# Patient Record
Sex: FEMALE | Race: WHITE | NOT HISPANIC OR LATINO | Employment: OTHER | URBAN - METROPOLITAN AREA
[De-identification: names, ages, dates, MRNs, and addresses within clinical notes are randomized per-mention and may not be internally consistent; named-entity substitution may affect disease eponyms.]

---

## 2017-03-13 ENCOUNTER — APPOINTMENT (OUTPATIENT)
Dept: GENERAL RADIOLOGY | Facility: HOSPITAL | Age: 82
End: 2017-03-13

## 2017-03-13 ENCOUNTER — HOSPITAL ENCOUNTER (EMERGENCY)
Facility: HOSPITAL | Age: 82
Discharge: HOME | End: 2017-03-13
Attending: EMERGENCY MEDICINE | Admitting: EMERGENCY MEDICINE

## 2017-03-13 VITALS
HEIGHT: 63 IN | WEIGHT: 144 LBS | OXYGEN SATURATION: 98 % | DIASTOLIC BLOOD PRESSURE: 79 MMHG | BODY MASS INDEX: 25.52 KG/M2 | TEMPERATURE: 98 F | SYSTOLIC BLOOD PRESSURE: 136 MMHG | RESPIRATION RATE: 14 BRPM | HEART RATE: 67 BPM

## 2017-03-13 DIAGNOSIS — R07.9 CHEST PAIN, UNSPECIFIED TYPE: Primary | ICD-10-CM

## 2017-03-13 LAB
ALBUMIN SERPL-MCNC: 4.6 G/DL (ref 3.2–4.8)
ALBUMIN/GLOB SERPL: 1.4 G/DL (ref 1.5–2.5)
ALP SERPL-CCNC: 83 U/L (ref 25–100)
ALT SERPL W P-5'-P-CCNC: 14 U/L (ref 7–40)
ANION GAP SERPL CALCULATED.3IONS-SCNC: 7 MMOL/L (ref 3–11)
AST SERPL-CCNC: 29 U/L (ref 0–33)
BASOPHILS # BLD AUTO: 0.04 10*3/MM3 (ref 0–0.2)
BASOPHILS NFR BLD AUTO: 0.5 % (ref 0–1)
BILIRUB SERPL-MCNC: 0.6 MG/DL (ref 0.3–1.2)
BNP SERPL-MCNC: 120 PG/ML (ref 0–100)
BUN BLD-MCNC: 11 MG/DL (ref 9–23)
BUN/CREAT SERPL: 18.3 (ref 7–25)
CALCIUM SPEC-SCNC: 10 MG/DL (ref 8.7–10.4)
CHLORIDE SERPL-SCNC: 104 MMOL/L (ref 99–109)
CO2 SERPL-SCNC: 29 MMOL/L (ref 20–31)
CREAT BLD-MCNC: 0.6 MG/DL (ref 0.6–1.3)
D DIMER PPP FEU-MCNC: 0.34 MG/L (FEU) (ref 0–0.5)
DEPRECATED RDW RBC AUTO: 45 FL (ref 37–54)
EOSINOPHIL # BLD AUTO: 0.14 10*3/MM3 (ref 0.1–0.3)
EOSINOPHIL NFR BLD AUTO: 1.9 % (ref 0–3)
ERYTHROCYTE [DISTWIDTH] IN BLOOD BY AUTOMATED COUNT: 13.7 % (ref 11.3–14.5)
GFR SERPL CREATININE-BSD FRML MDRD: 96 ML/MIN/1.73
GLOBULIN UR ELPH-MCNC: 3.2 GM/DL
GLUCOSE BLD-MCNC: 97 MG/DL (ref 70–100)
HCT VFR BLD AUTO: 40.4 % (ref 34.5–44)
HGB BLD-MCNC: 13.5 G/DL (ref 11.5–15.5)
HOLD SPECIMEN: NORMAL
HOLD SPECIMEN: NORMAL
IMM GRANULOCYTES # BLD: 0.01 10*3/MM3 (ref 0–0.03)
IMM GRANULOCYTES NFR BLD: 0.1 % (ref 0–0.6)
LIPASE SERPL-CCNC: 36 U/L (ref 6–51)
LYMPHOCYTES # BLD AUTO: 2.19 10*3/MM3 (ref 0.6–4.8)
LYMPHOCYTES NFR BLD AUTO: 29 % (ref 24–44)
MCH RBC QN AUTO: 29.9 PG (ref 27–31)
MCHC RBC AUTO-ENTMCNC: 33.4 G/DL (ref 32–36)
MCV RBC AUTO: 89.6 FL (ref 80–99)
MONOCYTES # BLD AUTO: 0.48 10*3/MM3 (ref 0–1)
MONOCYTES NFR BLD AUTO: 6.4 % (ref 0–12)
NEUTROPHILS # BLD AUTO: 4.68 10*3/MM3 (ref 1.5–8.3)
NEUTROPHILS NFR BLD AUTO: 62.1 % (ref 41–71)
PLATELET # BLD AUTO: 253 10*3/MM3 (ref 150–450)
PMV BLD AUTO: 9.4 FL (ref 6–12)
POTASSIUM BLD-SCNC: 3.9 MMOL/L (ref 3.5–5.5)
PROT SERPL-MCNC: 7.8 G/DL (ref 5.7–8.2)
RBC # BLD AUTO: 4.51 10*6/MM3 (ref 3.89–5.14)
SODIUM BLD-SCNC: 140 MMOL/L (ref 132–146)
TROPONIN I SERPL-MCNC: 0 NG/ML (ref 0–0.07)
TROPONIN I SERPL-MCNC: 0 NG/ML (ref 0–0.07)
WBC NRBC COR # BLD: 7.54 10*3/MM3 (ref 3.5–10.8)
WHOLE BLOOD HOLD SPECIMEN: NORMAL
WHOLE BLOOD HOLD SPECIMEN: NORMAL

## 2017-03-13 PROCEDURE — 80053 COMPREHEN METABOLIC PANEL: CPT | Performed by: EMERGENCY MEDICINE

## 2017-03-13 PROCEDURE — 83880 ASSAY OF NATRIURETIC PEPTIDE: CPT | Performed by: EMERGENCY MEDICINE

## 2017-03-13 PROCEDURE — 85379 FIBRIN DEGRADATION QUANT: CPT | Performed by: NURSE PRACTITIONER

## 2017-03-13 PROCEDURE — 83690 ASSAY OF LIPASE: CPT | Performed by: EMERGENCY MEDICINE

## 2017-03-13 PROCEDURE — 99284 EMERGENCY DEPT VISIT MOD MDM: CPT

## 2017-03-13 PROCEDURE — 85025 COMPLETE CBC W/AUTO DIFF WBC: CPT | Performed by: EMERGENCY MEDICINE

## 2017-03-13 PROCEDURE — 84484 ASSAY OF TROPONIN QUANT: CPT

## 2017-03-13 PROCEDURE — 71010 HC CHEST PA OR AP: CPT

## 2017-03-13 PROCEDURE — 93005 ELECTROCARDIOGRAM TRACING: CPT

## 2017-03-13 PROCEDURE — 36415 COLL VENOUS BLD VENIPUNCTURE: CPT

## 2017-03-13 PROCEDURE — 93005 ELECTROCARDIOGRAM TRACING: CPT | Performed by: EMERGENCY MEDICINE

## 2017-03-13 RX ORDER — MINOXIDIL 2.5 MG/1
2.5 TABLET ORAL 2 TIMES DAILY
COMMUNITY
End: 2019-11-13

## 2017-03-13 RX ORDER — SODIUM CHLORIDE 0.9 % (FLUSH) 0.9 %
10 SYRINGE (ML) INJECTION AS NEEDED
Status: DISCONTINUED | OUTPATIENT
Start: 2017-03-13 | End: 2017-03-13 | Stop reason: HOSPADM

## 2017-03-13 RX ORDER — VITAMIN E 268 MG
400 CAPSULE ORAL DAILY
Status: ON HOLD | COMMUNITY
End: 2017-04-13

## 2017-03-13 RX ORDER — AMLODIPINE BESYLATE 5 MG/1
5 TABLET ORAL EVERY MORNING
COMMUNITY
End: 2019-11-13

## 2017-03-13 RX ORDER — NITROGLYCERIN 0.4 MG/1
0.4 TABLET SUBLINGUAL
Status: DISCONTINUED | OUTPATIENT
Start: 2017-03-13 | End: 2017-03-13 | Stop reason: HOSPADM

## 2017-03-13 RX ORDER — ASPIRIN 81 MG/1
324 TABLET, CHEWABLE ORAL ONCE
Status: DISCONTINUED | OUTPATIENT
Start: 2017-03-13 | End: 2017-03-13 | Stop reason: HOSPADM

## 2017-03-13 RX ORDER — MULTIVITAMIN WITH IRON
250 TABLET ORAL DAILY
Status: ON HOLD | COMMUNITY
End: 2017-04-13

## 2017-03-13 RX ORDER — MELATONIN
1000 DAILY
COMMUNITY
End: 2023-03-08 | Stop reason: ALTCHOICE

## 2017-03-13 RX ORDER — ASPIRIN 81 MG/1
81 TABLET ORAL EVERY MORNING
COMMUNITY
End: 2019-11-13

## 2017-03-13 RX ORDER — LOSARTAN POTASSIUM 100 MG/1
50 TABLET ORAL DAILY
COMMUNITY
End: 2023-03-08 | Stop reason: ALTCHOICE

## 2017-03-13 RX ADMIN — Medication 10 ML: at 15:40

## 2017-03-13 RX ADMIN — NITROGLYCERIN 0.4 MG: 0.4 TABLET SUBLINGUAL at 16:32

## 2017-03-13 RX ADMIN — NITROGLYCERIN 0.4 MG: 0.4 TABLET SUBLINGUAL at 16:46

## 2017-03-13 NOTE — ED PROVIDER NOTES
Subjective   HPI Comments: Seen at pcp for cp today. Thought to have an irregular hr. Sent to er for eval. No hx of afib.    Patient is a 81 y.o. female presenting with chest pain.   Chest Pain   Pain location:  L chest  Pain quality: aching    Pain radiates to:  Does not radiate  Onset quality:  Gradual  Timing:  Constant  Progression:  Waxing and waning  Chronicity:  New  Context: at rest    Relieved by:  Rest  Worsened by:  Exertion  Associated symptoms: no abdominal pain, no back pain, no fever and no shortness of breath        Review of Systems   Constitutional: Negative for fever.   Respiratory: Negative for shortness of breath.    Cardiovascular: Positive for chest pain.   Gastrointestinal: Negative for abdominal pain.   Musculoskeletal: Negative for back pain.   All other systems reviewed and are negative.      Past Medical History   Diagnosis Date   • Hypertension    • Irregular heart rhythm        No Known Allergies    History reviewed. No pertinent past surgical history.    History reviewed. No pertinent family history.    Social History     Social History   • Marital status:      Spouse name: N/A   • Number of children: N/A   • Years of education: N/A     Social History Main Topics   • Smoking status: Never Smoker   • Smokeless tobacco: None   • Alcohol use No   • Drug use: No   • Sexual activity: Not Asked     Other Topics Concern   • None     Social History Narrative   • None           Objective   Physical Exam   Constitutional: She is oriented to person, place, and time. She appears well-developed and well-nourished.   HENT:   Head: Normocephalic and atraumatic.   Right Ear: External ear normal.   Left Ear: External ear normal.   Nose: Nose normal.   Mouth/Throat: Oropharynx is clear and moist.   Eyes: Conjunctivae and EOM are normal. Pupils are equal, round, and reactive to light.   Neck: Normal range of motion. Neck supple.   Cardiovascular: Normal rate, regular rhythm, normal heart sounds  and intact distal pulses.    Pulmonary/Chest: Effort normal and breath sounds normal.   Abdominal: Soft. Bowel sounds are normal.   Musculoskeletal: Normal range of motion.   Neurological: She is alert and oriented to person, place, and time.   Skin: Skin is warm and dry.   Psychiatric: She has a normal mood and affect. Her behavior is normal. Judgment and thought content normal.       Procedures         ED Course  ED Course   Comment By Time   NSR on monitor. Cxr and labs reassuring. Will send to cp clinic. Well aware of the ss of worsening condition. All thankful and agreeable. SHELLIE Marie 03/13 [id number]                  [facility]    Final diagnoses:   Chest pain, unspecified type            SHELLIE Marie  03/13/17 2046

## 2017-03-16 ENCOUNTER — APPOINTMENT (OUTPATIENT)
Dept: CARDIOLOGY | Facility: HOSPITAL | Age: 82
End: 2017-03-16

## 2017-03-16 ENCOUNTER — OFFICE VISIT (OUTPATIENT)
Dept: CARDIOLOGY | Facility: HOSPITAL | Age: 82
End: 2017-03-16

## 2017-03-16 VITALS
WEIGHT: 150.2 LBS | HEART RATE: 79 BPM | OXYGEN SATURATION: 98 % | BODY MASS INDEX: 26.61 KG/M2 | SYSTOLIC BLOOD PRESSURE: 145 MMHG | TEMPERATURE: 98.3 F | HEIGHT: 63 IN | DIASTOLIC BLOOD PRESSURE: 89 MMHG | RESPIRATION RATE: 18 BRPM

## 2017-03-16 DIAGNOSIS — I10 ESSENTIAL HYPERTENSION: ICD-10-CM

## 2017-03-16 DIAGNOSIS — R00.2 PALPITATIONS: Primary | ICD-10-CM

## 2017-03-16 DIAGNOSIS — I35.1 AORTIC VALVE INSUFFICIENCY, UNSPECIFIED ETIOLOGY: ICD-10-CM

## 2017-03-16 PROCEDURE — 99215 OFFICE O/P EST HI 40 MIN: CPT | Performed by: NURSE PRACTITIONER

## 2017-03-16 NOTE — PROGRESS NOTES
[facility]  [facility]  Chest Pain Clinic    Encounter Date:03/16/2017     Colleen Benavides  [address] [place] [address]  [phone]    1935    Liza Pacheco MD    Colleen Benavides is a 81 y.o. female.      Subjective:     Chief Complaint:  Chest Pain ([facility] ER 3/13/17)       HPI     Pt seen [facility] ER on 3/13/2017.  Patient states she had felt unwell for 2-3 days.  She checked her heart rate was elevated at 108 with an elevated blood pressure.  She spoke with her primary care provider who encouraged her to go to the emergency department.  Patient states that typically her heart rate is in the 50s and 60s.  She has a history of irregular heart beats, palpitations.  States she has worn a Holter monitor in the past while she lived in [place] approximately 2005.  At that time she also had an echocardiogram and reported stress test and was diagnosed with aortic regurgitation.  Data is deficient.  Patient states that she had a stress test since then here in [place] which she feels is completed at [facility], but I cannot find medical records to dispense support at this time.  Patient denies chest pain but will state she has a fluttering sensation in the center of her chest that is intermittent, short duration, this is been occurring for many years.  Denies worsening dyspnea.  States she'll feel mild dyspnea with exertion when she is climbing her steps resolves quickly this has not worsened.  Denies dizziness, presyncope, syncope.  She has mild lower extremity edema that improves with elevation.  She does have a history of hypertension.  Her ER visit troponins were negative, EKG showed sinus rhythm with PACs, chest x-ray failed enlarged heart but no acute pulmonary cardiac process.  A denies history of CAD, CVA/TIA, diagnosed atrial fibrillation or other arrhythmias, kidney disease, pulmonary disease, diabetes.  Patient has never smoked.    Cardiac risk  factors:  History of, hypertension.   age (>50)    Past Medical History   Diagnosis Date   • Aortic regurgitation    • Hypertension    • Irregular heart rhythm        Past Surgical History   Procedure Laterality Date   • Appendectomy     • Ovarian cyst removal Left    • Breast lumpectomy     • Small intestine surgery         No Known Allergies      Current Outpatient Prescriptions:   •  amLODIPine (NORVASC) 5 MG tablet, Take 5 mg by mouth Daily., Disp: , Rfl:   •  aspirin 81 MG EC tablet, Take 81 mg by mouth Daily., Disp: , Rfl:   •  cholecalciferol (VITAMIN D3) 1000 UNITS tablet, Take 1,000 Units by mouth Daily., Disp: , Rfl:   •  Flaxseed, Linseed, (COLD MILLED BAER FLAX SEED PO), Take 1,200 mg by mouth Daily., Disp: , Rfl:   •  losartan (COZAAR) 100 MG tablet, Take 100 mg by mouth Daily., Disp: , Rfl:   •  Magnesium 250 MG tablet, Take 250 mg by mouth Daily., Disp: , Rfl:   •  minoxidil (LONITEN) 2.5 MG tablet, Take 2.5 mg by mouth 2 (Two) Times a Day., Disp: , Rfl:   •  vitamin E 400 UNIT capsule, Take 400 Units by mouth Daily., Disp: , Rfl:     The following portions of the patient's history were reviewed and updated as appropriate in Epic:  Problem list, allergies, current medications, past medical and surgical history, past social and family history.     Review of Systems   Constitution: Negative for chills, decreased appetite, diaphoresis, fever, weakness, malaise/fatigue, night sweats, weight gain and weight loss.   HENT: Positive for congestion and headaches (migraine headaches). Negative for nosebleeds.    Eyes: Positive for visual disturbance. Negative for blurred vision and visual halos.   Cardiovascular: Positive for chest pain (fluttering sensation, not pain), irregular heartbeat (mild) and leg swelling. Negative for claudication, cyanosis, dyspnea on exertion, near-syncope, orthopnea, palpitations, paroxysmal nocturnal dyspnea and syncope.   Respiratory: Positive for snoring. Negative for cough,  "hemoptysis, shortness of breath, sleep disturbances due to breathing, sputum production and wheezing.    Endocrine: Negative.  Negative for cold intolerance, heat intolerance, polydipsia, polyphagia and polyuria.   Hematologic/Lymphatic: Negative.  Does not bruise/bleed easily.   Skin: Negative.  Negative for dry skin, itching and rash.   Musculoskeletal: Positive for arthritis. Negative for falls, joint pain, joint swelling, muscle weakness and myalgias.   Gastrointestinal: Negative.  Negative for bloating, abdominal pain, constipation, diarrhea, dysphagia, heartburn, melena, nausea and vomiting.   Genitourinary: Positive for nocturia. Negative for dysuria, flank pain and hematuria.   Neurological: Negative for difficulty with concentration, excessive daytime sleepiness, dizziness and loss of balance.   Psychiatric/Behavioral: Negative for altered mental status and depression. The patient has insomnia. The patient is not nervous/anxious.    Allergic/Immunologic: Positive for environmental allergies.       Objective:     Vitals:    03/16/17 0857 03/16/17 0911 03/16/17 0912   BP: 145/75 147/74 145/89   BP Location: Right arm Left arm Left arm   Patient Position: Sitting Sitting Standing   Pulse: 53  79   Resp: 18     Temp: 98.3 °F (36.8 °C)     TempSrc: Temporal Artery      SpO2: 98%     Weight: 150 lb 3.2 oz (68.1 kg)     Height: 63\" (160 cm)           Physical Exam   Constitutional: She is oriented to person, place, and time. She appears well-developed and well-nourished. No distress.   HENT:   Head: Normocephalic and atraumatic.   Mouth/Throat: Oropharynx is clear and moist.   Eyes: Conjunctivae are normal. Pupils are equal, round, and reactive to light. No scleral icterus.   Neck: No hepatojugular reflux and no JVD present. Carotid bruit is not present. No tracheal deviation present. No thyromegaly present.   Cardiovascular: Normal rate, regular rhythm, normal heart sounds and intact distal pulses.  Exam reveals " no friction rub.    No murmur heard.  Pulmonary/Chest: Effort normal and breath sounds normal.   Abdominal: Soft. Bowel sounds are normal. She exhibits no distension. There is no tenderness.   Musculoskeletal: She exhibits no edema.   Lymphadenopathy:     She has no cervical adenopathy.   Neurological: She is alert and oriented to person, place, and time.   Skin: Skin is warm, dry and intact. No rash noted. No cyanosis or erythema. No pallor.   Psychiatric: She has a normal mood and affect. Her behavior is normal. Thought content normal.   Vitals reviewed.      Lab and Diagnostic Review:  Admission on 03/13/2017, Discharged on 03/13/2017   Component Date Value Ref Range Status   • Glucose 03/13/2017 97  70 - 100 mg/dL Final   • BUN 03/13/2017 11  9 - 23 mg/dL Final   • Creatinine 03/13/2017 0.60  0.60 - 1.30 mg/dL Final   • Sodium 03/13/2017 140  132 - 146 mmol/L Final   • Potassium 03/13/2017 3.9  3.5 - 5.5 mmol/L Final   • Chloride 03/13/2017 104  99 - 109 mmol/L Final   • CO2 03/13/2017 29.0  20.0 - 31.0 mmol/L Final   • Calcium 03/13/2017 10.0  8.7 - 10.4 mg/dL Final   • Total Protein 03/13/2017 7.8  5.7 - 8.2 g/dL Final   • Albumin 03/13/2017 4.60  3.20 - 4.80 g/dL Final   • ALT (SGPT) 03/13/2017 14  7 - 40 U/L Final   • AST (SGOT) 03/13/2017 29  0 - 33 U/L Final   • Alkaline Phosphatase 03/13/2017 83  25 - 100 U/L Final   • Total Bilirubin 03/13/2017 0.6  0.3 - 1.2 mg/dL Final   • eGFR Non African Amer 03/13/2017 96  >60 mL/min/1.73 Final   • Globulin 03/13/2017 3.2  gm/dL Final   • A/G Ratio 03/13/2017 1.4* 1.5 - 2.5 g/dL Final   • BUN/Creatinine Ratio 03/13/2017 18.3  7.0 - 25.0 Final   • Anion Gap 03/13/2017 7.0  3.0 - 11.0 mmol/L Final   • Lipase 03/13/2017 36  6 - 51 U/L Final   • BNP 03/13/2017 120.0* 0.0 - 100.0 pg/mL Final   • Extra Tube 03/13/2017 hold for add-on   Final    Auto resulted   • Extra Tube 03/13/2017 Hold for add-ons.   Final    Auto resulted.   • Extra Tube 03/13/2017 hold for add-on    Final    Auto resulted   • Extra Tube 03/13/2017 Hold for add-ons.   Final    Auto resulted.   • WBC 03/13/2017 7.54  3.50 - 10.80 10*3/mm3 Final   • RBC 03/13/2017 4.51  3.89 - 5.14 10*6/mm3 Final   • Hemoglobin 03/13/2017 13.5  11.5 - 15.5 g/dL Final   • Hematocrit 03/13/2017 40.4  34.5 - 44.0 % Final   • MCV 03/13/2017 89.6  80.0 - 99.0 fL Final   • MCH 03/13/2017 29.9  27.0 - 31.0 pg Final   • MCHC 03/13/2017 33.4  32.0 - 36.0 g/dL Final   • RDW 03/13/2017 13.7  11.3 - 14.5 % Final   • RDW-SD 03/13/2017 45.0  37.0 - 54.0 fl Final   • MPV 03/13/2017 9.4  6.0 - 12.0 fL Final   • Platelets 03/13/2017 253  150 - 450 10*3/mm3 Final   • Neutrophil % 03/13/2017 62.1  41.0 - 71.0 % Final   • Lymphocyte % 03/13/2017 29.0  24.0 - 44.0 % Final   • Monocyte % 03/13/2017 6.4  0.0 - 12.0 % Final   • Eosinophil % 03/13/2017 1.9  0.0 - 3.0 % Final   • Basophil % 03/13/2017 0.5  0.0 - 1.0 % Final   • Immature Grans % 03/13/2017 0.1  0.0 - 0.6 % Final   • Neutrophils, Absolute 03/13/2017 4.68  1.50 - 8.30 10*3/mm3 Final   • Lymphocytes, Absolute 03/13/2017 2.19  0.60 - 4.80 10*3/mm3 Final   • Monocytes, Absolute 03/13/2017 0.48  0.00 - 1.00 10*3/mm3 Final   • Eosinophils, Absolute 03/13/2017 0.14  0.10 - 0.30 10*3/mm3 Final   • Basophils, Absolute 03/13/2017 0.04  0.00 - 0.20 10*3/mm3 Final   • Immature Grans, Absolute 03/13/2017 0.01  0.00 - 0.03 10*3/mm3 Final   • Troponin I 03/13/2017 0.00  0.00 - 0.07 ng/mL Final    Serial Number: [id number]    : [id number]   • D-Dimer, Quantitative 03/13/2017 0.34  0.00 - 0.50 mg/L (FEU) Final   • Troponin I 03/13/2017 0.00  0.00 - 0.07 ng/mL Final    Serial Number: [id number]    : [id number]     EXAMINATION: XR CHEST 1 VW-       INDICATION: Chest Pain triage protocol.       COMPARISON: 03/04/2014.      FINDINGS: Portable chest reveals heart to be enlarged. Underlying  chronic and emphysematous change is seen within the lung fields  bilaterally. Degenerative change is seen within the  spine. Pulmonary  vascularity is unremarkable.       IMPRESSION:  No acute cardiopulmonary disease. The heart is enlarged.      D: 03/13/2017  E: 03/14/2017      This report was finalized on 3/15/2017 9:57 AM by Dr. Charleen Medina MD.    EKG 3/13/17: Sinus rhythm with PACs, 88 bpm  Assessment and Plan:     1. Palpitations    - Cardiac Event Monitor:  30 day, Dr. Caballero to read  - Adult Transthoracic Echo Complete; Pt would like to schedule in [place] if possible    2. Essential hypertension      3. Aortic valve insufficiency, unspecified etiology    Discussed scheduling f/u with cardiology.  Pt wants to wait and discuss with her daughter.    F/u pending results.      *Please note that portions of this note were completed with a voice recognition program. Efforts were made to edit the dictations, but occasionally words are mistranscribed.

## 2017-03-17 ENCOUNTER — DOCUMENTATION (OUTPATIENT)
Dept: CARDIOLOGY | Facility: HOSPITAL | Age: 82
End: 2017-03-17

## 2017-03-17 ENCOUNTER — TELEPHONE (OUTPATIENT)
Dept: CARDIOLOGY | Facility: CLINIC | Age: 82
End: 2017-03-17

## 2017-03-17 DIAGNOSIS — I48.0 PAF (PAROXYSMAL ATRIAL FIBRILLATION) (HCC): Primary | ICD-10-CM

## 2017-03-17 NOTE — PROGRESS NOTES
Pt placed on event monitor 03/16/17.  Pt noted to have Afib on 03/17/17 with  bpm.  Called pt to review results.  Education provided:  What is atrial fibrillation, s/s,triggers,  medication management (rate control vs rhythm control) and stroke prevention. Pt is a Chadsvasc 4.  Will start Eliquis 5 mg BID (age 81, Creatinine 0.6, 68.1 kg).  Discussed adding BB, but pt declined at this time.  States her HR 50-60s majority of the time and could not tolerate BB in past.  She is scheduled echo in 1 week.  Discussed cardiology referral, she wants to consider her options at this time.  I have mailed an Afib education manual to the patient for review.  Discussed s/s bleeding that could be associated with Eliquis.

## 2017-03-17 NOTE — PROGRESS NOTES
Labs 2/9/17    Creatinine 0.8, sodium 142, potassium 4.8, chloride 104, carbon dioxide 31, calcium 9.9

## 2017-03-17 NOTE — PROGRESS NOTES
Received and reviewed requested medical records.    A reports a history of aortic stenosis.  Echocardiogram received and reviewed, completed [facility], 7/20/2005: EF 55-60%, no significant aortic stenosis, mild diastolic dysfunction.    Labs completed 2/9/17:  Cholesterol 279, HDL 61, triglycerides 121,   Pt is not on statin.      Labs completed 11/09/16  WBC 5.9, hemoglobin 12.6, hematocrit 38, platelet 267  TSH 3.75    Medical records will be scanned under last office visit 3/16/17

## 2017-03-17 NOTE — TELEPHONE ENCOUNTER
Was paged overhead by [email or web address] in regards to a cardiac monitor alert for Ms. Benavides. Report shows atrial fibrillation with HR at 110 BPM for nearly a minute. Patient currently sees SHELLIE Alvarado [facility] the [facility]. Called their office to notify them of the alert and faxed the report to their office at [phone].

## 2017-03-20 ENCOUNTER — TELEPHONE (OUTPATIENT)
Dept: CARDIOLOGY | Facility: HOSPITAL | Age: 82
End: 2017-03-20

## 2017-03-20 NOTE — TELEPHONE ENCOUNTER
Patient called the office today and noted that she has not started her eliquis yet due to concerns about bleeding. Long discussion with patient regarding need to begin eliquis due to chads vasc 4. Patient is agreeable to begin eliquis 5 mg with the first dose today. She is scheduled for an echo tomorrow. Patient instructed to call the office with any questions.

## 2017-03-23 ENCOUNTER — DOCUMENTATION (OUTPATIENT)
Dept: CARDIOLOGY | Facility: HOSPITAL | Age: 82
End: 2017-03-23

## 2017-03-23 NOTE — PROGRESS NOTES
Received and reviewed requested medical records    Echo 05/11/12:  Biatrial enlargement, LVH with asymmetrical septal hypertrophy, diastolic dysfunction with preserved EF 70%.    Have not been able to obtain a stress test as reported per patient.

## 2017-03-24 ENCOUNTER — DOCUMENTATION (OUTPATIENT)
Dept: CARDIOLOGY | Facility: HOSPITAL | Age: 82
End: 2017-03-24

## 2017-03-24 NOTE — PROGRESS NOTES
Pt called today and states she has scheduled to see Jacki Jeff MD today. She does not want me to schedule cardiology appointment here at [facility].

## 2017-03-27 ENCOUNTER — DOCUMENTATION (OUTPATIENT)
Dept: CARDIOLOGY | Facility: HOSPITAL | Age: 82
End: 2017-03-27

## 2017-03-27 DIAGNOSIS — I35.1 AORTIC VALVE INSUFFICIENCY, UNSPECIFIED ETIOLOGY: ICD-10-CM

## 2017-03-27 DIAGNOSIS — R00.2 PALPITATIONS: ICD-10-CM

## 2017-03-27 NOTE — PROGRESS NOTES
Received echocardiogram completed 3/21/17: EF 60%, LA 3.8 cm, moderate mitral valve insufficiency, mild to moderate aortic insufficiency.    Patient has seen Dr. Jacki Jeff in [facility].  And plans to continue her follow-up and management with Dr. Jeff.

## 2017-04-04 ENCOUNTER — TRANSCRIBE ORDERS (OUTPATIENT)
Dept: CARDIOLOGY | Facility: CLINIC | Age: 82
End: 2017-04-04

## 2017-04-04 DIAGNOSIS — R94.39 ABNORMAL STRESS TEST: Primary | ICD-10-CM

## 2017-04-06 DIAGNOSIS — R94.39 ABNORMAL STRESS TEST: Primary | ICD-10-CM

## 2017-04-06 RX ORDER — SODIUM CHLORIDE 0.9 % (FLUSH) 0.9 %
1-10 SYRINGE (ML) INJECTION AS NEEDED
Status: CANCELLED | OUTPATIENT
Start: 2017-04-06

## 2017-04-06 RX ORDER — ONDANSETRON 2 MG/ML
4 INJECTION INTRAMUSCULAR; INTRAVENOUS EVERY 6 HOURS PRN
Status: CANCELLED | OUTPATIENT
Start: 2017-04-06

## 2017-04-06 RX ORDER — ASPIRIN 325 MG
325 TABLET ORAL ONCE
Status: CANCELLED | OUTPATIENT
Start: 2017-04-06 | End: 2017-04-06

## 2017-04-06 RX ORDER — ASPIRIN 81 MG/1
325 TABLET ORAL DAILY
Status: CANCELLED | OUTPATIENT
Start: 2017-04-07

## 2017-04-06 RX ORDER — NITROGLYCERIN 0.4 MG/1
0.4 TABLET SUBLINGUAL
Status: CANCELLED | OUTPATIENT
Start: 2017-04-06

## 2017-04-06 RX ORDER — ACETAMINOPHEN 325 MG/1
650 TABLET ORAL EVERY 4 HOURS PRN
Status: CANCELLED | OUTPATIENT
Start: 2017-04-06

## 2017-04-13 ENCOUNTER — HOSPITAL ENCOUNTER (OUTPATIENT)
Facility: HOSPITAL | Age: 82
Setting detail: HOSPITAL OUTPATIENT SURGERY
Discharge: HOME | End: 2017-04-13
Attending: INTERNAL MEDICINE | Admitting: INTERNAL MEDICINE

## 2017-04-13 VITALS
HEIGHT: 63 IN | OXYGEN SATURATION: 97 % | WEIGHT: 150.79 LBS | DIASTOLIC BLOOD PRESSURE: 89 MMHG | BODY MASS INDEX: 26.72 KG/M2 | HEART RATE: 58 BPM | RESPIRATION RATE: 16 BRPM | SYSTOLIC BLOOD PRESSURE: 174 MMHG | TEMPERATURE: 98.5 F

## 2017-04-13 DIAGNOSIS — R94.39 ABNORMAL STRESS TEST: ICD-10-CM

## 2017-04-13 LAB
ARTICHOKE IGE QN: 141 MG/DL (ref 0–130)
CHOLEST SERPL-MCNC: 198 MG/DL (ref 0–200)
HBA1C MFR BLD: 5.4 % (ref 4.8–5.6)
HDLC SERPL-MCNC: 48 MG/DL (ref 40–60)
TRIGL SERPL-MCNC: 85 MG/DL (ref 0–150)

## 2017-04-13 PROCEDURE — S0260 H&P FOR SURGERY: HCPCS | Performed by: INTERNAL MEDICINE

## 2017-04-13 PROCEDURE — C1769 GUIDE WIRE: HCPCS | Performed by: INTERNAL MEDICINE

## 2017-04-13 PROCEDURE — 93458 L HRT ARTERY/VENTRICLE ANGIO: CPT | Performed by: INTERNAL MEDICINE

## 2017-04-13 PROCEDURE — C1894 INTRO/SHEATH, NON-LASER: HCPCS | Performed by: INTERNAL MEDICINE

## 2017-04-13 PROCEDURE — 0 IOPAMIDOL PER 1 ML: Performed by: INTERNAL MEDICINE

## 2017-04-13 PROCEDURE — 36415 COLL VENOUS BLD VENIPUNCTURE: CPT

## 2017-04-13 PROCEDURE — 83036 HEMOGLOBIN GLYCOSYLATED A1C: CPT | Performed by: NURSE PRACTITIONER

## 2017-04-13 PROCEDURE — 25010000002 MIDAZOLAM PER 1 MG: Performed by: INTERNAL MEDICINE

## 2017-04-13 PROCEDURE — 80061 LIPID PANEL: CPT | Performed by: NURSE PRACTITIONER

## 2017-04-13 PROCEDURE — 25010000002 FENTANYL CITRATE (PF) 100 MCG/2ML SOLUTION: Performed by: INTERNAL MEDICINE

## 2017-04-13 RX ORDER — ONDANSETRON 2 MG/ML
4 INJECTION INTRAMUSCULAR; INTRAVENOUS EVERY 6 HOURS PRN
Status: DISCONTINUED | OUTPATIENT
Start: 2017-04-13 | End: 2017-04-13 | Stop reason: HOSPADM

## 2017-04-13 RX ORDER — SODIUM CHLORIDE 9 MG/ML
1-3 INJECTION, SOLUTION INTRAVENOUS CONTINUOUS
Status: DISCONTINUED | OUTPATIENT
Start: 2017-04-13 | End: 2017-04-13 | Stop reason: HOSPADM

## 2017-04-13 RX ORDER — ASPIRIN 325 MG
325 TABLET, DELAYED RELEASE (ENTERIC COATED) ORAL DAILY
Status: DISCONTINUED | OUTPATIENT
Start: 2017-04-14 | End: 2017-04-13 | Stop reason: HOSPADM

## 2017-04-13 RX ORDER — FENTANYL CITRATE 50 UG/ML
INJECTION, SOLUTION INTRAMUSCULAR; INTRAVENOUS AS NEEDED
Status: DISCONTINUED | OUTPATIENT
Start: 2017-04-13 | End: 2017-04-13 | Stop reason: HOSPADM

## 2017-04-13 RX ORDER — SODIUM CHLORIDE 0.9 % (FLUSH) 0.9 %
1-10 SYRINGE (ML) INJECTION AS NEEDED
Status: CANCELLED | OUTPATIENT
Start: 2017-04-13

## 2017-04-13 RX ORDER — SODIUM CHLORIDE 0.9 % (FLUSH) 0.9 %
1-10 SYRINGE (ML) INJECTION AS NEEDED
Status: DISCONTINUED | OUTPATIENT
Start: 2017-04-13 | End: 2017-04-13 | Stop reason: HOSPADM

## 2017-04-13 RX ORDER — HYDROCODONE BITARTRATE AND ACETAMINOPHEN 5; 325 MG/1; MG/1
1 TABLET ORAL EVERY 4 HOURS PRN
Status: CANCELLED | OUTPATIENT
Start: 2017-04-13 | End: 2017-04-23

## 2017-04-13 RX ORDER — THIAMINE HCL 100 MG
500 TABLET ORAL DAILY
COMMUNITY
End: 2019-11-13

## 2017-04-13 RX ORDER — ASPIRIN 81 MG/1
81 TABLET, CHEWABLE ORAL DAILY
Status: CANCELLED | OUTPATIENT
Start: 2017-04-13

## 2017-04-13 RX ORDER — ASPIRIN 325 MG
325 TABLET ORAL ONCE
Status: DISCONTINUED | OUTPATIENT
Start: 2017-04-13 | End: 2017-04-13 | Stop reason: HOSPADM

## 2017-04-13 RX ORDER — NITROGLYCERIN 0.4 MG/1
0.4 TABLET SUBLINGUAL
Status: DISCONTINUED | OUTPATIENT
Start: 2017-04-13 | End: 2017-04-13 | Stop reason: HOSPADM

## 2017-04-13 RX ORDER — MIDAZOLAM HYDROCHLORIDE 1 MG/ML
INJECTION INTRAMUSCULAR; INTRAVENOUS AS NEEDED
Status: DISCONTINUED | OUTPATIENT
Start: 2017-04-13 | End: 2017-04-13 | Stop reason: HOSPADM

## 2017-04-13 RX ORDER — ASPIRIN 81 MG/1
TABLET, CHEWABLE ORAL
Status: COMPLETED
Start: 2017-04-13 | End: 2017-04-13

## 2017-04-13 RX ORDER — LIDOCAINE HYDROCHLORIDE 10 MG/ML
INJECTION, SOLUTION INFILTRATION; PERINEURAL AS NEEDED
Status: DISCONTINUED | OUTPATIENT
Start: 2017-04-13 | End: 2017-04-13 | Stop reason: HOSPADM

## 2017-04-13 RX ORDER — ACETAMINOPHEN 325 MG/1
650 TABLET ORAL EVERY 4 HOURS PRN
Status: DISCONTINUED | OUTPATIENT
Start: 2017-04-13 | End: 2017-04-13 | Stop reason: HOSPADM

## 2017-04-13 RX ORDER — ACETAMINOPHEN 325 MG/1
650 TABLET ORAL EVERY 4 HOURS PRN
Status: CANCELLED | OUTPATIENT
Start: 2017-04-13

## 2017-04-13 RX ORDER — MULTIVIT WITH MINERALS/LUTEIN
1000 TABLET ORAL DAILY
COMMUNITY
End: 2019-11-13

## 2017-04-13 RX ADMIN — ASPIRIN 243 MG: 81 TABLET, CHEWABLE ORAL at 09:44

## 2017-04-13 RX ADMIN — SODIUM CHLORIDE 2.98 ML/KG/HR: 9 INJECTION, SOLUTION INTRAVENOUS at 09:40

## 2017-04-13 NOTE — PLAN OF CARE
Problem: Patient Care Overview (Adult)  Goal: Plan of Care Review  Outcome: Ongoing (interventions implemented as appropriate)    04/13/17 1515   Patient Care Overview   Progress improving   Coping/Psychosocial Response Interventions   Plan Of Care Reviewed With patient;family   Outcome Evaluation   Outcome Summary/Follow up Plan PT AND FAMILY ABLE TO VERBALIZE UNDERSTANDING OF DC INSTRUCTIONS- NO QUESTIONS AT THIS TIME- SITE STABLE AND AMBULATING WITH NO ISSUES.       Goal: Discharge Needs Assessment  Outcome: Ongoing (interventions implemented as appropriate)    04/13/17 1515   Discharge Needs Assessment   Concerns To Be Addressed no discharge needs identified         Problem: Cardiac Catheterization with/without PCI (Adult)  Goal: Signs and Symptoms of Listed Potential Problems Will be Absent or Manageable (Cardiac Catheterization with/without PCI)  Outcome: Ongoing (interventions implemented as appropriate)    04/13/17 1410   Cardiac Catheterization with/without PCI   Problems Assessed (Cardiac Catheterization) all   Problems Present (Cardiac Catheterization) none

## 2017-04-13 NOTE — H&P
[facility] at [facility]   History and Physical    Referring Provider: Jacki Jeff MD    Patient Care Team:  Liza Pacheco MD as PCP - General (Family Medicine)     Problem List:  1. Abnormal stress test  1. 3/26/17 myocardial perfusion study with anterior and inferior ischemia.  Normal EF.  2. Paroxysmal atrial fibrillation  3. Hypertension  4. Dyslipidemia  5. Surgeries:  1. Appendectomy  2. Left ovarian tumor resection  3. Partial hysterectomy  4. Left rotator cuff surgery  5. Lumpectomy, breast  6. Intestine mass resection.      No Known Allergies        Current Facility-Administered Medications:   •  acetaminophen (TYLENOL) tablet 650 mg, 650 mg, Oral, Q4H PRN, SHELLIE Trejo  •  aspirin tablet 325 mg, 325 mg, Oral, Once **AND** [START ON 4/14/2017] aspirin EC tablet 325 mg, 325 mg, Oral, Daily, SHELLIE Trejo  •  nitroglycerin (NITROSTAT) SL tablet 0.4 mg, 0.4 mg, Sublingual, Q5 Min PRN, SHELLIE Trejo  •  ondansetron (ZOFRAN) injection 4 mg, 4 mg, Intravenous, Q6H PRN, SHELLIE Trejo  •  sodium chloride 0.9 % flush 1-10 mL, 1-10 mL, Intravenous, PRN, SHELLIE Trejo  •  sodium chloride 0.9 % infusion, 1-3 mL/kg/hr, Intravenous, Continuous, Lee Laoz MD      sodium chloride 1-3 mL/kg/hr       Prescriptions Prior to Admission   Medication Sig Dispense Refill Last Dose   • amLODIPine (NORVASC) 5 MG tablet Take 5 mg by mouth Every Morning.   4/13/2017 at 0630   • aspirin 81 MG EC tablet Take 81 mg by mouth Every Morning.   4/13/2017 at 0630   • cholecalciferol (VITAMIN D3) 1000 UNITS tablet Take 1,000 Units by mouth Daily.   4/12/2017 at Unknown time   • losartan (COZAAR) 100 MG tablet Take 100 mg by mouth Every Morning.   4/13/2017 at 0630   • Magnesium 500 MG tablet Take 500 mg by mouth Daily.   4/12/2017 at Unknown time   • minoxidil (LONITEN) 2.5 MG tablet Take 2.5 mg by mouth 2 (Two) Times a Day.   4/13/2017 at 0630   • vitamin E 1000 UNIT  capsule Take 1,000 Units by mouth Daily.   4/12/2017 at Unknown time   • apixaban (ELIQUIS) 5 MG tablet tablet Take 1 tablet by mouth 2 (Two) Times a Day. (Patient taking differently: Take 2.5 mg by mouth 2 (Two) Times a Day.) 60 tablet 3 4/10/2017         Subjective .   History of present illness:    Patient is an 81-year-old  female who we are seeing today for abnormal myocardial perfusion study and subsequent cardiac catheterization.  She has no previous history of coronary disease.  She was recently diagnosed with paroxysmal atrial fibrillation and initiated on Eliquis and Lopressor therapy.  During her evaluation she underwent a myocardial perfusion study which suggested anterior and inferior ischemia.  She notes over the last month some recurrent left-sided chest discomfort.  This has no specific triggering factors or relieving factors.  There are no associated symptoms.  She denies any syncope.  Does note some occasional bilateral lower extremity edema which is improved with elevation.  Roughly a month ago she presented to the [facility] emergency department with complaints of palpitations, elevated heart rate, and chest discomfort.  She was discharged home after negative evaluation with an event monitor.  This revealed atrial fibrillation.  She's been following with Dr. Jeff for this.      Social History     Social History   • Marital status:      Spouse name: N/A   • Number of children: N/A   • Years of education: N/A     Occupational History   • Not on file.     Social History Main Topics   • Smoking status: Never Smoker   • Smokeless tobacco: Never Used   • Alcohol use No   • Drug use: No   • Sexual activity: Not on file     Other Topics Concern   • Not on file     Social History Narrative    Patient consumes 3 servings decaf coffee weekly, no soda, decaf tea.     Patient lives at home alone.              Family History   Problem Relation Age of Onset   • Brain cancer Mother    •  "Colon cancer Father    • No Known Problems Sister    • No Known Problems Brother    • No Known Problems Maternal Grandmother    • No Known Problems Maternal Grandfather    • No Known Problems Paternal Grandmother    • No Known Problems Paternal Grandfather    • No Known Problems Sister    • No Known Problems Daughter    • No Known Problems Daughter    • No Known Problems Daughter          Review of Systems:  Review of Systems   Constitution: Negative for fever, weakness and malaise/fatigue.   HENT: Negative for headaches and nosebleeds.    Eyes: Negative for redness and visual disturbance.   Cardiovascular: Negative for orthopnea, palpitations and paroxysmal nocturnal dyspnea.   Respiratory: Negative for cough, snoring, sputum production and wheezing.    Hematologic/Lymphatic: Negative for bleeding problem.   Skin: Negative for flushing, itching and rash.   Musculoskeletal: Negative for falls, joint pain and muscle cramps.   Gastrointestinal: Negative for abdominal pain, diarrhea, heartburn, nausea and vomiting.   Genitourinary: Negative for hematuria.   Neurological: Negative for excessive daytime sleepiness, dizziness and tremors.   Psychiatric/Behavioral: Negative for substance abuse. The patient is not nervous/anxious.               Objective   Vitals:  Blood pressure 144/66, pulse 59, temperature 98.5 °F (36.9 °C), temperature source Tympanic, resp. rate 18, height 63\" (160 cm), weight 150 lb 12.7 oz (68.4 kg), SpO2 96 %.       Physical Exam   Constitutional: She is oriented to person, place, and time. She appears well-developed and well-nourished.   HENT:   Head: Normocephalic and atraumatic.   Mouth/Throat: Oropharynx is clear and moist.   Neck: No JVD present. Carotid bruit is not present.   Cardiovascular: Normal rate, regular rhythm, S1 normal, S2 normal, normal heart sounds and intact distal pulses.    Left Giovanni's test positive   Pulmonary/Chest: Effort normal and breath sounds normal. No respiratory " distress.   Abdominal: Soft. Bowel sounds are normal. There is no tenderness.   Musculoskeletal: She exhibits no edema or deformity.   Neurological: She is alert and oriented to person, place, and time.   Skin: Skin is warm and dry.   Psychiatric: Her behavior is normal. Thought content normal.     I have examined the patient and agree with the above findings       Results Review:  I reviewed the patient's new clinical results.            Invalid input(s): LABALBU, PROT          No results found for: TROPONINI              4/3/17 labs from outside facility  White count 6.6  Hemoglobin 11.6  Hematocrit 35.6  Platelets 247  Sodium 139  Potassium 4.4  BUN 17  Creatinine 0.8  AST of 13  ALT of 20    Assessment/Plan     1. Abnormal myocardial perfusion study, high risk.  2. Hypertension  3. Dyslipidemia  4. Paroxysmal atrial fibrillation.  Last Eliquis dose was 2 days ago.      Plan:    1. We'll proceed to cardiac catheterization plus or minus catheter-based intervention today.  Risk and benefits were discussed with the patient and her family.  She verbalizes understanding and wishes to proceed.  Further recommendations to follow cardiac catheterization.      SHELLIE Trejo obtained past medical, family history, social history, review of systems and functioned as a scribe for the remainder of the dictation for Dr. Lazo.      SHELLIE Trejo  04/13/17  9:30 AM  I, Lee Lazo MD, personally performed the services described in this documentation as scribed by the above individual in my presence, and it is both accurate and complete    Please note that portions of this note may have been completed with a voice recognition program. Efforts were made to edit the dictations, but occasionally words are mistranscribed.

## 2017-04-13 NOTE — PLAN OF CARE
Problem: Patient Care Overview (Adult)  Goal: Plan of Care Review  Outcome: Ongoing (interventions implemented as appropriate)  DONE AT  [facility]    04/13/17 0900   Patient Care Overview   Progress progress toward functional goals as expected   Coping/Psychosocial Response Interventions   Plan Of Care Reviewed With patient;daughter  (DONE AT  [facility])

## 2017-05-08 ENCOUNTER — OFFICE VISIT (OUTPATIENT)
Dept: CARDIOLOGY | Facility: CLINIC | Age: 82
End: 2017-05-08

## 2017-05-08 DIAGNOSIS — I48.0 PAF (PAROXYSMAL ATRIAL FIBRILLATION) (HCC): ICD-10-CM

## 2017-05-08 PROCEDURE — 93272 ECG/REVIEW INTERPRET ONLY: CPT | Performed by: INTERNAL MEDICINE

## 2017-11-06 ENCOUNTER — TRANSCRIBE ORDERS (OUTPATIENT)
Dept: ADMINISTRATIVE | Facility: HOSPITAL | Age: 82
End: 2017-11-06

## 2017-11-06 DIAGNOSIS — I49.5 SICK SINUS SYNDROME (HCC): Primary | ICD-10-CM

## 2017-11-06 DIAGNOSIS — I35.0 NON-RHEUMATIC AORTIC STENOSIS: ICD-10-CM

## 2017-11-06 DIAGNOSIS — I48.0 PAROXYSMAL ATRIAL FIBRILLATION (HCC): ICD-10-CM

## 2017-11-06 DIAGNOSIS — R00.1 PERSISTENT SINUS BRADYCARDIA: ICD-10-CM

## 2017-11-14 ENCOUNTER — HOSPITAL ENCOUNTER (OUTPATIENT)
Facility: HOSPITAL | Age: 82
Discharge: HOME | End: 2017-11-14
Attending: INTERNAL MEDICINE | Admitting: INTERNAL MEDICINE

## 2017-11-14 ENCOUNTER — APPOINTMENT (OUTPATIENT)
Dept: GENERAL RADIOLOGY | Facility: HOSPITAL | Age: 82
End: 2017-11-14

## 2017-11-14 VITALS
HEART RATE: 69 BPM | OXYGEN SATURATION: 94 % | DIASTOLIC BLOOD PRESSURE: 69 MMHG | WEIGHT: 147.93 LBS | SYSTOLIC BLOOD PRESSURE: 115 MMHG | RESPIRATION RATE: 14 BRPM | BODY MASS INDEX: 26.21 KG/M2 | TEMPERATURE: 97.6 F | HEIGHT: 63 IN

## 2017-11-14 DIAGNOSIS — I35.0 NON-RHEUMATIC AORTIC STENOSIS: ICD-10-CM

## 2017-11-14 DIAGNOSIS — R00.1 PERSISTENT SINUS BRADYCARDIA: ICD-10-CM

## 2017-11-14 DIAGNOSIS — I48.0 PAROXYSMAL ATRIAL FIBRILLATION (HCC): ICD-10-CM

## 2017-11-14 DIAGNOSIS — I49.5 SICK SINUS SYNDROME (HCC): ICD-10-CM

## 2017-11-14 LAB
ANION GAP SERPL CALCULATED.3IONS-SCNC: 4 MMOL/L (ref 3–11)
BUN BLD-MCNC: 10 MG/DL (ref 9–23)
BUN/CREAT SERPL: 16.7 (ref 7–25)
CALCIUM SPEC-SCNC: 9.2 MG/DL (ref 8.7–10.4)
CHLORIDE SERPL-SCNC: 106 MMOL/L (ref 99–109)
CO2 SERPL-SCNC: 29 MMOL/L (ref 20–31)
CREAT BLD-MCNC: 0.6 MG/DL (ref 0.6–1.3)
DEPRECATED RDW RBC AUTO: 47.7 FL (ref 37–54)
ERYTHROCYTE [DISTWIDTH] IN BLOOD BY AUTOMATED COUNT: 13.8 % (ref 11.3–14.5)
GFR SERPL CREATININE-BSD FRML MDRD: 96 ML/MIN/1.73
GLUCOSE BLD-MCNC: 98 MG/DL (ref 70–100)
HCT VFR BLD AUTO: 38.8 % (ref 34.5–44)
HGB BLD-MCNC: 12.5 G/DL (ref 11.5–15.5)
MCH RBC QN AUTO: 30.6 PG (ref 27–31)
MCHC RBC AUTO-ENTMCNC: 32.2 G/DL (ref 32–36)
MCV RBC AUTO: 95.1 FL (ref 80–99)
PLATELET # BLD AUTO: 226 10*3/MM3 (ref 150–450)
PMV BLD AUTO: 9.3 FL (ref 6–12)
POTASSIUM BLD-SCNC: 3.5 MMOL/L (ref 3.5–5.5)
RBC # BLD AUTO: 4.08 10*6/MM3 (ref 3.89–5.14)
SODIUM BLD-SCNC: 139 MMOL/L (ref 132–146)
WBC NRBC COR # BLD: 5.55 10*3/MM3 (ref 3.5–10.8)

## 2017-11-14 PROCEDURE — C1898 LEAD, PMKR, OTHER THAN TRANS: HCPCS | Performed by: INTERNAL MEDICINE

## 2017-11-14 PROCEDURE — 94660 CPAP INITIATION&MGMT: CPT

## 2017-11-14 PROCEDURE — 71010 HC CHEST PA OR AP: CPT

## 2017-11-14 PROCEDURE — 25010000002 HEPARIN (PORCINE) PER 1000 UNITS: Performed by: INTERNAL MEDICINE

## 2017-11-14 PROCEDURE — 25010000002 MIDAZOLAM PER 1 MG: Performed by: INTERNAL MEDICINE

## 2017-11-14 PROCEDURE — 25010000002 FENTANYL CITRATE (PF) 100 MCG/2ML SOLUTION: Performed by: INTERNAL MEDICINE

## 2017-11-14 PROCEDURE — 25010000002 ONDANSETRON PER 1 MG: Performed by: INTERNAL MEDICINE

## 2017-11-14 PROCEDURE — 0 IOPAMIDOL PER 1 ML: Performed by: INTERNAL MEDICINE

## 2017-11-14 PROCEDURE — G0378 HOSPITAL OBSERVATION PER HR: HCPCS

## 2017-11-14 PROCEDURE — C1894 INTRO/SHEATH, NON-LASER: HCPCS | Performed by: INTERNAL MEDICINE

## 2017-11-14 PROCEDURE — 33208 INSRT HEART PM ATRIAL & VENT: CPT

## 2017-11-14 PROCEDURE — 80048 BASIC METABOLIC PNL TOTAL CA: CPT | Performed by: INTERNAL MEDICINE

## 2017-11-14 PROCEDURE — 36415 COLL VENOUS BLD VENIPUNCTURE: CPT

## 2017-11-14 PROCEDURE — 25010000003 CEFAZOLIN IN DEXTROSE 2-4 GM/100ML-% SOLUTION: Performed by: INTERNAL MEDICINE

## 2017-11-14 PROCEDURE — 85027 COMPLETE CBC AUTOMATED: CPT | Performed by: INTERNAL MEDICINE

## 2017-11-14 PROCEDURE — C1785 PMKR, DUAL, RATE-RESP: HCPCS | Performed by: INTERNAL MEDICINE

## 2017-11-14 DEVICE — GEN PM ASSURITY MRI DR RF PM2272: Type: IMPLANTABLE DEVICE | Status: FUNCTIONAL

## 2017-11-14 DEVICE — LD PM MRI TENDRIL LPA1200M46: Type: IMPLANTABLE DEVICE | Status: FUNCTIONAL

## 2017-11-14 DEVICE — LD PM MRI TENDRIL LPA1200M52: Type: IMPLANTABLE DEVICE | Status: FUNCTIONAL

## 2017-11-14 RX ORDER — SODIUM CHLORIDE 0.9 % (FLUSH) 0.9 %
1-10 SYRINGE (ML) INJECTION AS NEEDED
Status: DISCONTINUED | OUTPATIENT
Start: 2017-11-14 | End: 2017-11-14 | Stop reason: HOSPADM

## 2017-11-14 RX ORDER — SODIUM CHLORIDE 9 MG/ML
INJECTION, SOLUTION INTRAVENOUS CONTINUOUS PRN
Status: DISCONTINUED | OUTPATIENT
Start: 2017-11-14 | End: 2017-11-14 | Stop reason: HOSPADM

## 2017-11-14 RX ORDER — FENTANYL CITRATE 50 UG/ML
INJECTION, SOLUTION INTRAMUSCULAR; INTRAVENOUS AS NEEDED
Status: DISCONTINUED | OUTPATIENT
Start: 2017-11-14 | End: 2017-11-14 | Stop reason: HOSPADM

## 2017-11-14 RX ORDER — MIDAZOLAM HYDROCHLORIDE 1 MG/ML
INJECTION INTRAMUSCULAR; INTRAVENOUS AS NEEDED
Status: DISCONTINUED | OUTPATIENT
Start: 2017-11-14 | End: 2017-11-14 | Stop reason: HOSPADM

## 2017-11-14 RX ORDER — ONDANSETRON 2 MG/ML
4 INJECTION INTRAMUSCULAR; INTRAVENOUS EVERY 6 HOURS PRN
Status: DISCONTINUED | OUTPATIENT
Start: 2017-11-14 | End: 2017-11-14 | Stop reason: HOSPADM

## 2017-11-14 RX ORDER — HYDROCODONE BITARTRATE AND ACETAMINOPHEN 5; 325 MG/1; MG/1
1 TABLET ORAL EVERY 6 HOURS PRN
Qty: 15 TABLET | Refills: 0 | Status: SHIPPED | OUTPATIENT
Start: 2017-11-14 | End: 2019-11-13

## 2017-11-14 RX ORDER — LIDOCAINE HYDROCHLORIDE 10 MG/ML
INJECTION, SOLUTION INFILTRATION; PERINEURAL AS NEEDED
Status: DISCONTINUED | OUTPATIENT
Start: 2017-11-14 | End: 2017-11-14 | Stop reason: HOSPADM

## 2017-11-14 RX ORDER — BUPIVACAINE HYDROCHLORIDE 5 MG/ML
INJECTION, SOLUTION EPIDURAL; INTRACAUDAL AS NEEDED
Status: DISCONTINUED | OUTPATIENT
Start: 2017-11-14 | End: 2017-11-14 | Stop reason: HOSPADM

## 2017-11-14 RX ORDER — CEFAZOLIN SODIUM 2 G/100ML
2 INJECTION, SOLUTION INTRAVENOUS EVERY 8 HOURS
Status: DISCONTINUED | OUTPATIENT
Start: 2017-11-14 | End: 2017-11-14 | Stop reason: HOSPADM

## 2017-11-14 RX ORDER — ONDANSETRON 2 MG/ML
INJECTION INTRAMUSCULAR; INTRAVENOUS AS NEEDED
Status: DISCONTINUED | OUTPATIENT
Start: 2017-11-14 | End: 2017-11-14 | Stop reason: HOSPADM

## 2017-11-14 RX ORDER — ACETAMINOPHEN 325 MG/1
650 TABLET ORAL EVERY 4 HOURS PRN
Status: DISCONTINUED | OUTPATIENT
Start: 2017-11-14 | End: 2017-11-14 | Stop reason: HOSPADM

## 2017-11-14 RX ADMIN — CEFAZOLIN SODIUM 2 G: 2 INJECTION, SOLUTION INTRAVENOUS at 08:52

## 2017-11-14 NOTE — PRE-SEDATION DOCUMENTATION
Sedation Plan    ASA 2     Mallampati class: II.    Risks, benefits, and alternatives discussed with patient.

## 2017-11-14 NOTE — H&P
[facility]  History and Physical            Chief complaint: Tachy-tom syndrome, symptomatic atral fibrllation    Subjective     Patient is a 82 y.o. female presents with atrial fibrillation, paroxysmal. There has been difficulty achieving adequate rate controll and holter monitor showed bradycardia during the day with HR in the 30bpm range and as high as 180bpm. EF is normal. She was consented for DCPM for tachy-tom syndrome in clinic. There have been no new symptoms or updates.       Review of Systems  12-point ROS performed and otherwise negative except as per HPI.    History  Past Medical History:   Diagnosis Date   • Aortic regurgitation    • Arrhythmia     PAF   • Hypertension    • Irregular heart rhythm      Past Surgical History:   Procedure Laterality Date   • APPENDECTOMY     • BREAST LUMPECTOMY     • CARDIAC CATHETERIZATION N/A 4/13/2017    Procedure: Left Heart Cath;  Surgeon: Lee Lazo MD;  Location: [facility] CATH INVASIVE LOCATION;  Service:    • HYSTERECTOMY     • OVARIAN CYST REMOVAL Left    • ROTATOR CUFF REPAIR Left    • SMALL INTESTINE SURGERY       Family History   Problem Relation Age of Onset   • Brain cancer Mother    • Colon cancer Father    • No Known Problems Sister    • No Known Problems Brother    • No Known Problems Maternal Grandmother    • No Known Problems Maternal Grandfather    • No Known Problems Paternal Grandmother    • No Known Problems Paternal Grandfather    • No Known Problems Sister    • No Known Problems Daughter    • No Known Problems Daughter    • No Known Problems Daughter      Social History   Substance Use Topics   • Smoking status: Never Smoker   • Smokeless tobacco: Never Used   • Alcohol use No     Prescriptions Prior to Admission   Medication Sig Dispense Refill Last Dose   • amLODIPine (NORVASC) 5 MG tablet Take 5 mg by mouth Every Morning.   11/13/2017 at Unknown time   • aspirin 81 MG EC tablet Take 81 mg by mouth Every Morning.    11/13/2017 at Unknown time   • cholecalciferol (VITAMIN D3) 1000 UNITS tablet Take 1,000 Units by mouth Daily.   11/13/2017 at Unknown time   • losartan (COZAAR) 100 MG tablet Take 100 mg by mouth Every Morning.   11/13/2017 at Unknown time   • Magnesium 500 MG tablet Take 500 mg by mouth Daily.   11/13/2017 at Unknown time   • minoxidil (LONITEN) 2.5 MG tablet Take 2.5 mg by mouth 2 (Two) Times a Day.   11/13/2017 at Unknown time   • vitamin E 1000 UNIT capsule Take 1,000 Units by mouth Daily.   11/13/2017 at Unknown time   • apixaban (ELIQUIS) 5 MG tablet tablet Take 1 tablet by mouth 2 (Two) Times a Day. (Patient taking differently: Take 2.5 mg by mouth 2 (Two) Times a Day.) 60 tablet 3 11/9/2017   • metoprolol tartrate (LOPRESSOR) 25 MG tablet Take 12.5 mg by mouth 2 (Two) Times a Day. Takes for heart rate >115   4/10/2017     Allergies:  Review of patient's allergies indicates no known allergies.    Objective     Vital Signs  Temp:  [97.6 °F (36.4 °C)] 97.6 °F (36.4 °C)  Heart Rate:  [116] 116  Resp:  [16] 16  BP: (134-148)/(102-113) 134/113    Physical Exam:   GEN: WD WN WF   Head: NCAT   Eyes: EOMI nonicteric   ENT: MMM OP benign   Neck: JVD flat  No carotid bruit   Lungs: CTAB no WRR   CV: irreg irreg REBECCA at LUSB   Abd: NABS NT ND   Ext: WWP  Trace edema BL   Neuro: AOx3 MAEW   Psych: appropriate mood/affect   Skin: Warm and dry    Results Review:    I reviewed the patient's new clinical results.    Assessment/Plan     Active Problems:    Persistent sinus bradycardia    Sick sinus syndrome    Paroxysmal atrial fibrillation    Non-rheumatic aortic stenosis      Assessment and Plan:    1. Tachy-tom syndrome  Plan DC PM, has been in sinus rhythm recently and will potentially cardiovert in the future.       Danika Paul MD  11/14/17  8:18 AM

## 2017-11-14 NOTE — PROCEDURES
PROCEDURE NOTE - IMPLANTATION OF A DUAL-CHAMBER PERMANENT PACEMAKER       DATE OF PROCEDURE: 11/14/17      REFERRING PHYSICIAN: Humberto      PROCEDURES PERFORMED: Implantation of a dual-chamber permanent pacemaker.    INDICATION FOR THIS PROCEDURE:  Sick sinus syndrome/tachy-tom syndrome      DESCRIPTION OF THE PROCEDURE: The patient was brought to the Cardiovascular Laboratory in a fasting, nonsedated state. The risks and benefits of conscious sedation and implantation of a dual-chamber permanent pacemaker were explained to the patient and written informed consent was obtained. The patient was then prepped and draped in the usual sterile manner. The left infraclavicular fossa area was then anesthetized with 1% lidocaine, and the skin was incised using a scalpel. The pocket was created along the prepectoralis fascia using both blunt dissection and electrocautery. Access to the axillary vein was then obtained using the modified Seldinger technique a flexible guidewire was advanced under fluoroscopy into the inferior vena cava below the diaphragm.  A active fixation ventricular lead was positioned in the  right ventricular apex and an active fixation atrial lead was positioned in the right atrial appendage.  Pacing threshold were excellent The right ventricular lead threshold was measured at 0.5 volts at 0.5 milliseconds with an R wave measured at 8.2 millivolts and an impedance measured at 650 ohms. The right atrial lead threshold was  Not measured because of the prescence of atrial fibrillation. P wave measured at 1 millivolts and an impedance measured at 600 ohms. No diaphragmatic stimulation was noted with pacing at 10 volts through either the right atrial or right ventricular leads. The leads were attached to the prepectoralis fascia using a 2-0 silk and a sewing ring. The leads were attached to the generator, and the generator was placed in the pocket after the pocket was irrigated with Ancef solution. The  pocket was closed with 2-0 Vicryl, and the skin was closed with 2-0 Vicryl and 4-0 Monocryl. Steri-Strips and a sterile dressing were then applied. Estimated blood loss was minimal. Hemostasis was adequate.  The pacemaker was then programmed to its final settings. The patient tolerated the procedure without difficulty and was transferred to his room in stable condition.    SEDATION TIME: 1 hour 22 min    COMPLICATIONS: none    IMPLANTED HARDWARD:  1. The pacemaker generator was a [employer] JudePM [id number] with a serial No. of [id number].  2. The right atrial lead was a [employer] [id number] with a serial No. of  [id number].  3. The right ventricular lead was a [employer] [id number] [id number]  with a serial No. of [id number].    FINAL PROGRAM PARAMETERS: The device was set at DDDR mode switch on with the lower rate of 60 beats per minute and the upper rate of 120 beats per minute.     FINAL IMPRESSION:   1. Successful implantation of a dual chamber permanent pacemaker    RECOMMENDATIONS:   1. The patient will be monitored on telemetry  2. If stable the patient will be discharged to home after a CXR     Danika Paul MD  11/14/17  11:31 AM

## 2017-11-14 NOTE — PLAN OF CARE
Problem: Patient Care Overview (Adult)  Goal: Plan of Care Review  Outcome: Ongoing (interventions implemented as appropriate)    11/14/17 1328   Coping/Psychosocial Response Interventions   Plan Of Care Reviewed With patient;family   Patient Care Overview   Progress improving   Outcome Evaluation   Outcome Summary/Follow up Plan PT AND FAMILY ABLE TO VERBALIZE UNDERSTANDING OF DC INSTRUCTIONS- NO QUESTIONS AT THIS TIME- SITE STABLE AND AMBULATING WITH NO ISSUES.       Goal: Discharge Needs Assessment  Outcome: Ongoing (interventions implemented as appropriate)    11/14/17 1328   Discharge Needs Assessment   Concerns To Be Addressed no discharge needs identified         Problem: Cardiac Rhythm Management Device (Adult)  Goal: Signs and Symptoms of Listed Potential Problems Will be Absent or Manageable (Cardiac Rhythm Management Device)  Outcome: Ongoing (interventions implemented as appropriate)    11/14/17 1328   Cardiac Rhythm Management Device   Problems Assessed (Cardiac Rhythm Management Device) all   Problems Present (Cardiac Rhythm Management Device) none

## 2018-01-05 LAB — TOAL ENROLLMENT DAYS: 30

## 2019-01-02 ENCOUNTER — TELEPHONE (OUTPATIENT)
Dept: GYNECOLOGIC ONCOLOGY | Facility: CLINIC | Age: 84
End: 2019-01-02

## 2019-01-02 NOTE — TELEPHONE ENCOUNTER
----- Message from Hortencia Arizmendi sent at 1/2/2019  9:38 AM EST -----  Regarding: BELINDA - STAPH INFECTION   Contact: [phone]  SHE HAS STAPH INFECTION  AND SHE HAS BEEN TREATING IT HERSELF, BUT SHE IS WONDERING IF SHE SHOULD BE GOING TO [facility] FOR THE IV TREATMENT.     ALSO DOES SHE NEED AN APPOINTMENT TO SEE DR TREVINO   Returned pt's call, instructed her to call her PCP, pt v/u, I informed her also that we no longer see benign cases, gave her number for [facility].  Pt v/u, will call them for future gyn needs.

## 2019-11-12 PROBLEM — I50.22 CHRONIC SYSTOLIC (CONGESTIVE) HEART FAILURE (HCC): Status: ACTIVE | Noted: 2019-11-12

## 2019-11-12 PROBLEM — I48.0 PAF (PAROXYSMAL ATRIAL FIBRILLATION) (HCC): Status: RESOLVED | Noted: 2017-03-17 | Resolved: 2019-11-12

## 2019-11-12 PROBLEM — I48.19 PERSISTENT ATRIAL FIBRILLATION: Status: ACTIVE | Noted: 2017-11-06

## 2019-11-12 NOTE — PROGRESS NOTES
Electrophysiology Clinic Consult     Colleen Benavides  1935  [unfilled]  [unfilled]    11/13/19    DATE OF ADMISSION: (Not on file)  [facility] CARDIOLOGY    Liza Pacheco MD  2017 [address] [address]    Chief Complaint   Patient presents with   • Shortness of Breath     Consult   • Irregular Heart Beat     Problem List:  1. Persistent atrial fibrillation  1. Diagnosed 2017 on event monitor  2. CHADSVASc = 4, on Eliquis  3. Intolerant to amiodarone secondary to liver dysfunction  4. Intolerant to propafenone secondary to swelling  2. Sick sinus syndrome/tachy-tom syndrome  1. S/p [employer] DDD PPM implant, 11/14/2017, Dr. Paul  3. Abnormal stress test  1. 3/26/17 myocardial perfusion study with anterior and inferior ischemia.  Normal EF.  2. Left heart catheterization 4/13/2017: Normal coronary arteries, normal LVEF  4. Chronic systolic/diastolic congestive heart failure  1. Echocardiogram 10/1/19: EF 30-35%, mild concentric LVH, mild AI, severe MR, moderate TR, mild to moderate pulmonary HTN, RVSP 49 mmHg  5. Hypertension  6. Dyslipidemia  7. DEBO  8. Surgeries:  1. Appendectomy  2. Left ovarian tumor resection  3. Partial hysterectomy  4. Left rotator cuff surgery  5. Lumpectomy, breast  6. Intestine mass resection.    History of Present Illness:  Patient is a 84 year old  female who presents today in consultation by referral from Dr. Paul for further evaluation and management of atrial fibrillation.  Patient was diagnosed with atrial fibrillation on an event monitor in 2017.  She has been intolerant to both amiodarone and propafenone secondary to liver dysfunction and swelling.  She has been in more persistent atrial fibrillation recently and has had 98% RV pacing on her device interrogations.  She has also had a subsequent reduction in her LVEF from 55% to 30%.  She presents to discuss possible upgrade of her device to a Bi-V pacemaker.   She is overall asymptomatic from an afib standpoint and denies any c/o palpitations.  She is anticoagulated with Eliquis and denies any bleeding issues or hx of TIA/CVA.  She is moderately symptomatic with c/o fatigue and shortness of breath, symptoms exacerbated by minimal activity and relieved by rest.  Symptoms occur daily and have worsened over the last 3 months but believes she has had symptoms for approximately the last year.  LE swelling has been improved now that she is on Bumex.  BP's have been running on the lower side lately but she denies any c/o presyncope or syncope.      No Known Allergies     Cannot display prior to admission medications because the patient has not been admitted in this contact.            Current Outpatient Medications:   •  apixaban (ELIQUIS) 2.5 MG tablet tablet, 2.5 mg 2 (Two) Times a Day., Disp: , Rfl:   •  B Complex Vitamins (B COMPLEX 1 PO), Daily., Disp: , Rfl:   •  BUMETANIDE PO, Take 2 mg by mouth Daily., Disp: , Rfl:   •  cholecalciferol (VITAMIN D3) 1000 UNITS tablet, Take 1,000 Units by mouth Daily., Disp: , Rfl:   •  digoxin (LANOXIN) 125 MCG tablet, Take 125 mcg by mouth Daily., Disp: , Rfl: 2  •  levothyroxine (SYNTHROID, LEVOTHROID) 50 MCG tablet, 50 mcg Daily., Disp: , Rfl: 3  •  losartan (COZAAR) 100 MG tablet, Take 50 mg by mouth Daily., Disp: , Rfl:   •  metoprolol tartrate (LOPRESSOR) 25 MG tablet, Take 100 mg by mouth 2 (Two) Times a Day. Takes for heart rate >115, Disp: , Rfl:   •  Multiple Vitamins-Minerals (PRESERVISION AREDS 2+MULTI VIT PO), Take  by mouth Daily., Disp: , Rfl:   •  omeprazole (priLOSEC) 20 MG capsule, Take 20 mg by mouth Daily., Disp: , Rfl:   •  rivastigmine (EXELON) 4.6 MG/24HR patch, Place 1 patch on the skin as directed by provider Daily., Disp: , Rfl:   •  vitamin C (ASCORBIC ACID) 500 MG tablet, Take 500 mg by mouth Daily., Disp: , Rfl:     Social History     Socioeconomic History   • Marital status:      Spouse name: Not on  file   • Number of children: Not on file   • Years of education: Not on file   • Highest education level: Not on file   Tobacco Use   • Smoking status: Never Smoker   • Smokeless tobacco: Never Used   Substance and Sexual Activity   • Alcohol use: No   • Drug use: No   • Sexual activity: Defer   Social History Narrative    Patient consumes 3 servings decaf coffee weekly, no soda, decaf tea.     Patient lives at home alone.            Family History   Problem Relation Age of Onset   • Brain cancer Mother    • Colon cancer Father    • No Known Problems Sister    • No Known Problems Brother    • No Known Problems Maternal Grandmother    • No Known Problems Maternal Grandfather    • No Known Problems Paternal Grandmother    • No Known Problems Paternal Grandfather    • No Known Problems Sister    • No Known Problems Daughter    • No Known Problems Daughter    • No Known Problems Daughter        REVIEW OF SYSTEMS:   CONST:  No weight loss, fever, chills, weakness + fatigue.   HEENT:  No visual loss, blurred vision, double vision, yellow sclerae.                   + hearing loss, no congestion, sore throat.   SKIN:      No rashes, urticaria, ulcers, sores.     RESP:     + shortness of breath, no hemoptysis, cough, sputum.   GI:           No anorexia, nausea, vomiting, diarrhea. No abdominal pain, melena.   :         No burning on urination, hematuria or increased frequency.  ENDO:    No diaphoresis, cold or heat intolerance. No polyuria or polydipsia.   NEURO:  No headache, dizziness, syncope, paralysis, ataxia, or parasthesias.                  No change in bowel or bladder control. No history of CVA/TIA  MUSC:    No muscle, back pain, joint pain or stiffness.   HEME:    No anemia, bleeding, bruising. No history of DVT/PE.  PSYCH:  No history of depression, anxiety    Vitals:    11/13/19 0817   BP: 98/60   BP Location: Right arm   Patient Position: Sitting   Pulse: 70   SpO2: 98%   Weight: 53.6 kg (118 lb 3.2 oz)  "  Height: 160 cm (63\")                 Physical Exam:  GEN: Well nourished, well-developed, no acute distress  HEENT: Normocephalic, atraumatic, PERRLA, moist mucous membranes  NECK: Supple, NO JVD, no thyromegaly, no lymphadenopathy  CARD: S1S2, RRR, no murmur, gallop, rub, PMI displaced  LUNGS: Clear to auscultation, normal respiratory effort  ABDOMEN: Soft, nontender, normal bowel sounds  EXTREMITIES: No gross deformities, no clubbing, cyanosis, trace LE edema  SKIN: Warm, dry  NEURO: No focal deficits, alert and oriented x 3  PSYCHIATRIC: Normal affect and mood      I personally viewed and interpreted the patient's EKG/Telemetry/lab data    Lab Results   Component Value Date    GLUCOSE 98 11/14/2017    CALCIUM 9.2 11/14/2017     11/14/2017    K 3.5 11/14/2017    CO2 29.0 11/14/2017     11/14/2017    BUN 10 11/14/2017    CREATININE 0.60 11/14/2017    EGFRIFNONA 96 11/14/2017    BCR 16.7 11/14/2017    ANIONGAP 4.0 11/14/2017     Lab Results   Component Value Date    WBC 5.55 11/14/2017    HGB 12.5 11/14/2017    HCT 38.8 11/14/2017    MCV 95.1 11/14/2017     11/14/2017     No results found for: INR, PROTIME  No results found for: TSH, E0VYSTP, U3WAUYZ, THYROIDAB          ECG 12 Lead  Date/Time: 11/13/2019 8:42 AM  Performed by: Wilfredo Armenta MD  Authorized by: Wilfredo Armenta MD   Comparison: not compared with previous ECG   Previous ECG: no previous ECG available  Rhythm: atrial fibrillation and paced  BPM: 70                ICD-10-CM ICD-9-CM   1. Persistent atrial fibrillation I48.19 427.31   2. Chronic systolic (congestive) heart failure (CMS/HCC) I50.22 428.22     428.0   3. Sick sinus syndrome (CMS/HCC) I49.5 427.81   4. Essential hypertension I10 401.9   5. Valvular heart disease I38 424.90     Manual device interrogation: [employer] PPM with normal function, <1% RA paced, >99% RV paced, persistent atrial fibrillation, 8.3-9 years battery life    Assessment and Plan:   1. Persistent " atrial fibrillation:  - Persistent atrial fibrillation since July with previous intolerance to amiodarone and propafenone.  Overall asymptomatic and rate controlled.    - CHADSVASc = 4, on Eliquis 2.5 mg bid (age, weight)  2. Chronic systolic CHF:  - Newly reduced EF of 30-35% in October (previously 55%) with NYHA class III symptoms.  Meds limited bu hypotension. Newly reduced EF likely secondary to 100% RV pacing though cannot rule out that her EF is reduced secondary to her severe MR noted on echocardiogram.  We did discuss upgrading to Bi-V PPM including risks, benefits and alternatives to the procedure.    3. SSS:  - S/p PPM implant, device with normal function, see #2 regarding upgrading to Bi-V device.  4. HTN:  - BP low today, patient asymptomatic.  5. VHD:  - Severe MR per echocardiogram.  Will reassess after Bi-V PPM upgrade.    Scribed for Wilfredo Armenta MD by Rosanne Spring, SHELLIE. 11/13/2019  9:08 AM     I, Wilfredo Armenta MD, personally performed the services described in this documentation as scribed by the above named individual in my presence, and it is both accurate and complete.  11/13/2019  9:08 AM

## 2019-11-13 ENCOUNTER — CONSULT (OUTPATIENT)
Dept: CARDIOLOGY | Facility: CLINIC | Age: 84
End: 2019-11-13

## 2019-11-13 VITALS
WEIGHT: 118.2 LBS | OXYGEN SATURATION: 98 % | SYSTOLIC BLOOD PRESSURE: 98 MMHG | BODY MASS INDEX: 20.94 KG/M2 | HEART RATE: 70 BPM | HEIGHT: 63 IN | DIASTOLIC BLOOD PRESSURE: 60 MMHG

## 2019-11-13 DIAGNOSIS — I48.19 PERSISTENT ATRIAL FIBRILLATION (HCC): Primary | ICD-10-CM

## 2019-11-13 DIAGNOSIS — I50.22 CHRONIC SYSTOLIC (CONGESTIVE) HEART FAILURE (HCC): ICD-10-CM

## 2019-11-13 DIAGNOSIS — I10 ESSENTIAL HYPERTENSION: ICD-10-CM

## 2019-11-13 DIAGNOSIS — I38 VALVULAR HEART DISEASE: ICD-10-CM

## 2019-11-13 DIAGNOSIS — I49.5 SICK SINUS SYNDROME (HCC): ICD-10-CM

## 2019-11-13 PROCEDURE — 93280 PM DEVICE PROGR EVAL DUAL: CPT | Performed by: INTERNAL MEDICINE

## 2019-11-13 PROCEDURE — 99204 OFFICE O/P NEW MOD 45 MIN: CPT | Performed by: INTERNAL MEDICINE

## 2019-11-13 RX ORDER — A/C/E/ZINC OX/CUPRIC OX/LUTEIN 7160-113
TABLET ORAL DAILY
COMMUNITY
End: 2019-11-13

## 2019-11-13 RX ORDER — DIGOXIN 125 MCG
125 TABLET ORAL DAILY
Refills: 2 | COMMUNITY
Start: 2019-10-19 | End: 2023-03-08 | Stop reason: ALTCHOICE

## 2019-11-13 RX ORDER — ASCORBIC ACID 500 MG
500 TABLET ORAL DAILY
COMMUNITY
End: 2023-03-08 | Stop reason: ALTCHOICE

## 2019-11-13 RX ORDER — OMEPRAZOLE 20 MG/1
20 CAPSULE, DELAYED RELEASE ORAL DAILY
COMMUNITY
End: 2019-12-19

## 2019-11-13 RX ORDER — LEVOTHYROXINE SODIUM 0.05 MG/1
50 TABLET ORAL DAILY
Refills: 3 | COMMUNITY
Start: 2019-11-08 | End: 2023-03-08 | Stop reason: ALTCHOICE

## 2019-11-13 RX ORDER — RIVASTIGMINE 9.5 MG/24H
1 PATCH, EXTENDED RELEASE TRANSDERMAL DAILY
COMMUNITY
End: 2023-03-08 | Stop reason: ALTCHOICE

## 2019-11-26 ENCOUNTER — PREP FOR SURGERY (OUTPATIENT)
Dept: OTHER | Facility: HOSPITAL | Age: 84
End: 2019-11-26

## 2019-11-26 DIAGNOSIS — I50.22 CHRONIC SYSTOLIC (CONGESTIVE) HEART FAILURE (HCC): Primary | ICD-10-CM

## 2019-11-26 RX ORDER — NITROGLYCERIN 0.4 MG/1
0.4 TABLET SUBLINGUAL
Status: CANCELLED | OUTPATIENT
Start: 2019-11-26

## 2019-11-26 RX ORDER — SODIUM CHLORIDE 0.9 % (FLUSH) 0.9 %
10 SYRINGE (ML) INJECTION AS NEEDED
Status: CANCELLED | OUTPATIENT
Start: 2019-11-26

## 2019-11-26 RX ORDER — SODIUM CHLORIDE 0.9 % (FLUSH) 0.9 %
3 SYRINGE (ML) INJECTION EVERY 12 HOURS SCHEDULED
Status: CANCELLED | OUTPATIENT
Start: 2019-11-26

## 2019-11-26 RX ORDER — PROMETHAZINE HYDROCHLORIDE 25 MG/ML
12.5 INJECTION, SOLUTION INTRAMUSCULAR; INTRAVENOUS EVERY 4 HOURS PRN
Status: CANCELLED | OUTPATIENT
Start: 2019-11-26

## 2019-11-26 RX ORDER — ACETAMINOPHEN 325 MG/1
650 TABLET ORAL EVERY 4 HOURS PRN
Status: CANCELLED | OUTPATIENT
Start: 2019-11-26

## 2019-11-26 RX ORDER — SODIUM CHLORIDE 9 MG/ML
1 INJECTION, SOLUTION INTRAVENOUS CONTINUOUS
Status: CANCELLED | OUTPATIENT
Start: 2019-11-26 | End: 2019-11-26

## 2019-12-19 ENCOUNTER — APPOINTMENT (OUTPATIENT)
Dept: PREADMISSION TESTING | Facility: HOSPITAL | Age: 84
End: 2019-12-19

## 2019-12-19 DIAGNOSIS — I50.22 CHRONIC SYSTOLIC (CONGESTIVE) HEART FAILURE (HCC): ICD-10-CM

## 2019-12-19 LAB
ANION GAP SERPL CALCULATED.3IONS-SCNC: 11 MMOL/L (ref 5–15)
BUN BLD-MCNC: 25 MG/DL (ref 8–23)
BUN/CREAT SERPL: 18.5 (ref 7–25)
CALCIUM SPEC-SCNC: 10.2 MG/DL (ref 8.6–10.5)
CHLORIDE SERPL-SCNC: 92 MMOL/L (ref 98–107)
CO2 SERPL-SCNC: 33 MMOL/L (ref 22–29)
CREAT BLD-MCNC: 1.35 MG/DL (ref 0.57–1)
DEPRECATED RDW RBC AUTO: 53.7 FL (ref 37–54)
ERYTHROCYTE [DISTWIDTH] IN BLOOD BY AUTOMATED COUNT: 15 % (ref 12.3–15.4)
GFR SERPL CREATININE-BSD FRML MDRD: 37 ML/MIN/1.73
GLUCOSE BLD-MCNC: 134 MG/DL (ref 65–99)
HBA1C MFR BLD: 5.9 % (ref 4.8–5.6)
HCT VFR BLD AUTO: 39.6 % (ref 34–46.6)
HGB BLD-MCNC: 12.5 G/DL (ref 12–15.9)
INR PPP: 1.27 (ref 0.85–1.16)
MCH RBC QN AUTO: 30.6 PG (ref 26.6–33)
MCHC RBC AUTO-ENTMCNC: 31.6 G/DL (ref 31.5–35.7)
MCV RBC AUTO: 96.8 FL (ref 79–97)
PLATELET # BLD AUTO: 163 10*3/MM3 (ref 140–450)
PMV BLD AUTO: 9.7 FL (ref 6–12)
POTASSIUM BLD-SCNC: 3.9 MMOL/L (ref 3.5–5.2)
PROTHROMBIN TIME: 15.3 SECONDS (ref 11.2–14.3)
RBC # BLD AUTO: 4.09 10*6/MM3 (ref 3.77–5.28)
SODIUM BLD-SCNC: 136 MMOL/L (ref 136–145)
WBC NRBC COR # BLD: 5.01 10*3/MM3 (ref 3.4–10.8)

## 2019-12-19 PROCEDURE — 85027 COMPLETE CBC AUTOMATED: CPT | Performed by: PHYSICIAN ASSISTANT

## 2019-12-19 PROCEDURE — 80048 BASIC METABOLIC PNL TOTAL CA: CPT | Performed by: PHYSICIAN ASSISTANT

## 2019-12-19 PROCEDURE — 83036 HEMOGLOBIN GLYCOSYLATED A1C: CPT | Performed by: PHYSICIAN ASSISTANT

## 2019-12-19 PROCEDURE — 85610 PROTHROMBIN TIME: CPT | Performed by: PHYSICIAN ASSISTANT

## 2019-12-19 PROCEDURE — 36415 COLL VENOUS BLD VENIPUNCTURE: CPT

## 2019-12-19 RX ORDER — OMEPRAZOLE 20 MG/1
20 CAPSULE, DELAYED RELEASE ORAL DAILY
COMMUNITY
End: 2023-03-08 | Stop reason: ALTCHOICE

## 2019-12-19 RX ORDER — PREDNISOLONE ACETATE 10 MG/ML
1 SUSPENSION/ DROPS OPHTHALMIC 3 TIMES DAILY
COMMUNITY
End: 2023-03-08 | Stop reason: ALTCHOICE

## 2019-12-22 ENCOUNTER — PREP FOR SURGERY (OUTPATIENT)
Dept: OTHER | Facility: HOSPITAL | Age: 84
End: 2019-12-22

## 2019-12-23 ENCOUNTER — HOSPITAL ENCOUNTER (OUTPATIENT)
Facility: HOSPITAL | Age: 84
Discharge: HOME | End: 2019-12-24
Attending: INTERNAL MEDICINE | Admitting: INTERNAL MEDICINE

## 2019-12-23 DIAGNOSIS — I50.22 CHRONIC SYSTOLIC (CONGESTIVE) HEART FAILURE (HCC): ICD-10-CM

## 2019-12-23 LAB
ANION GAP SERPL CALCULATED.3IONS-SCNC: 11 MMOL/L (ref 5–15)
BUN BLD-MCNC: 23 MG/DL (ref 8–23)
BUN/CREAT SERPL: 17.6 (ref 7–25)
CALCIUM SPEC-SCNC: 10 MG/DL (ref 8.6–10.5)
CHLORIDE SERPL-SCNC: 90 MMOL/L (ref 98–107)
CO2 SERPL-SCNC: 33 MMOL/L (ref 22–29)
CREAT BLD-MCNC: 1.31 MG/DL (ref 0.57–1)
GFR SERPL CREATININE-BSD FRML MDRD: 39 ML/MIN/1.73
GLUCOSE BLD-MCNC: 89 MG/DL (ref 65–99)
POTASSIUM BLD-SCNC: 3.9 MMOL/L (ref 3.5–5.2)
SODIUM BLD-SCNC: 134 MMOL/L (ref 136–145)

## 2019-12-23 PROCEDURE — C2621 PMKR, OTHER THAN SING/DUAL: HCPCS | Performed by: INTERNAL MEDICINE

## 2019-12-23 PROCEDURE — C1769 GUIDE WIRE: HCPCS | Performed by: INTERNAL MEDICINE

## 2019-12-23 PROCEDURE — 99153 MOD SED SAME PHYS/QHP EA: CPT | Performed by: INTERNAL MEDICINE

## 2019-12-23 PROCEDURE — 33225 L VENTRIC PACING LEAD ADD-ON: CPT | Performed by: INTERNAL MEDICINE

## 2019-12-23 PROCEDURE — 25010000003 LIDOCAINE 1 % SOLUTION: Performed by: INTERNAL MEDICINE

## 2019-12-23 PROCEDURE — 33228 REMV&REPLC PM GEN DUAL LEAD: CPT | Performed by: INTERNAL MEDICINE

## 2019-12-23 PROCEDURE — A9270 NON-COVERED ITEM OR SERVICE: HCPCS | Performed by: NURSE PRACTITIONER

## 2019-12-23 PROCEDURE — A9270 NON-COVERED ITEM OR SERVICE: HCPCS | Performed by: INTERNAL MEDICINE

## 2019-12-23 PROCEDURE — 25010000002 MIDAZOLAM PER 1 MG: Performed by: INTERNAL MEDICINE

## 2019-12-23 PROCEDURE — 25010000002 FENTANYL CITRATE (PF) 100 MCG/2ML SOLUTION: Performed by: INTERNAL MEDICINE

## 2019-12-23 PROCEDURE — 99152 MOD SED SAME PHYS/QHP 5/>YRS: CPT | Performed by: INTERNAL MEDICINE

## 2019-12-23 PROCEDURE — C1730 CATH, EP, 19 OR FEW ELECT: HCPCS | Performed by: INTERNAL MEDICINE

## 2019-12-23 PROCEDURE — C1892 INTRO/SHEATH,FIXED,PEEL-AWAY: HCPCS | Performed by: INTERNAL MEDICINE

## 2019-12-23 PROCEDURE — 63710000001 METOPROLOL TARTRATE 50 MG TABLET: Performed by: NURSE PRACTITIONER

## 2019-12-23 PROCEDURE — 63710000001: Performed by: NURSE PRACTITIONER

## 2019-12-23 PROCEDURE — 0 IOPAMIDOL PER 1 ML: Performed by: INTERNAL MEDICINE

## 2019-12-23 PROCEDURE — 80048 BASIC METABOLIC PNL TOTAL CA: CPT | Performed by: PHYSICIAN ASSISTANT

## 2019-12-23 PROCEDURE — G0378 HOSPITAL OBSERVATION PER HR: HCPCS

## 2019-12-23 PROCEDURE — 33229 REMV&REPLC PM GEN MULT LEADS: CPT | Performed by: INTERNAL MEDICINE

## 2019-12-23 PROCEDURE — C1887 CATHETER, GUIDING: HCPCS

## 2019-12-23 PROCEDURE — 63710000001 HYDROCODONE-ACETAMINOPHEN 5-325 MG TABLET: Performed by: INTERNAL MEDICINE

## 2019-12-23 PROCEDURE — C1900 LEAD, CORONARY VENOUS: HCPCS | Performed by: INTERNAL MEDICINE

## 2019-12-23 PROCEDURE — 94660 CPAP INITIATION&MGMT: CPT

## 2019-12-23 PROCEDURE — 25010000002 VANCOMYCIN PER 500 MG: Performed by: PHYSICIAN ASSISTANT

## 2019-12-23 PROCEDURE — 25010000002 ONDANSETRON PER 1 MG: Performed by: INTERNAL MEDICINE

## 2019-12-23 DEVICE — IMPLANTABLE DEVICE: Type: IMPLANTABLE DEVICE | Status: FUNCTIONAL

## 2019-12-23 DEVICE — LD QUARTET CRT VENT LNG SPACING 86CM: Type: IMPLANTABLE DEVICE | Status: FUNCTIONAL

## 2019-12-23 RX ORDER — HYDROCODONE BITARTRATE AND ACETAMINOPHEN 5; 325 MG/1; MG/1
1 TABLET ORAL EVERY 4 HOURS PRN
Status: DISCONTINUED | OUTPATIENT
Start: 2019-12-23 | End: 2019-12-24 | Stop reason: HOSPADM

## 2019-12-23 RX ORDER — SODIUM CHLORIDE 0.9 % (FLUSH) 0.9 %
3 SYRINGE (ML) INJECTION EVERY 12 HOURS SCHEDULED
Status: DISCONTINUED | OUTPATIENT
Start: 2019-12-23 | End: 2019-12-24 | Stop reason: HOSPADM

## 2019-12-23 RX ORDER — SODIUM CHLORIDE 0.9 % (FLUSH) 0.9 %
10 SYRINGE (ML) INJECTION AS NEEDED
Status: DISCONTINUED | OUTPATIENT
Start: 2019-12-23 | End: 2019-12-24 | Stop reason: HOSPADM

## 2019-12-23 RX ORDER — ACETAMINOPHEN 325 MG/1
650 TABLET ORAL EVERY 4 HOURS PRN
Status: DISCONTINUED | OUTPATIENT
Start: 2019-12-23 | End: 2019-12-24 | Stop reason: HOSPADM

## 2019-12-23 RX ORDER — METOPROLOL TARTRATE 50 MG/1
100 TABLET, FILM COATED ORAL EVERY 12 HOURS SCHEDULED
Status: DISCONTINUED | OUTPATIENT
Start: 2019-12-23 | End: 2019-12-24 | Stop reason: HOSPADM

## 2019-12-23 RX ORDER — ACETAMINOPHEN 650 MG/1
650 SUPPOSITORY RECTAL EVERY 4 HOURS PRN
Status: DISCONTINUED | OUTPATIENT
Start: 2019-12-23 | End: 2019-12-24 | Stop reason: HOSPADM

## 2019-12-23 RX ORDER — PANTOPRAZOLE SODIUM 40 MG/1
40 TABLET, DELAYED RELEASE ORAL EVERY MORNING
Status: DISCONTINUED | OUTPATIENT
Start: 2019-12-24 | End: 2019-12-24 | Stop reason: HOSPADM

## 2019-12-23 RX ORDER — FLUMAZENIL 0.1 MG/ML
INJECTION INTRAVENOUS AS NEEDED
Status: DISCONTINUED | OUTPATIENT
Start: 2019-12-23 | End: 2019-12-23 | Stop reason: HOSPADM

## 2019-12-23 RX ORDER — NITROGLYCERIN 0.4 MG/1
0.4 TABLET SUBLINGUAL
Status: DISCONTINUED | OUTPATIENT
Start: 2019-12-23 | End: 2019-12-23 | Stop reason: HOSPADM

## 2019-12-23 RX ORDER — SODIUM CHLORIDE 0.9 % (FLUSH) 0.9 %
3 SYRINGE (ML) INJECTION EVERY 12 HOURS SCHEDULED
Status: DISCONTINUED | OUTPATIENT
Start: 2019-12-23 | End: 2019-12-23 | Stop reason: HOSPADM

## 2019-12-23 RX ORDER — LIDOCAINE HYDROCHLORIDE 10 MG/ML
INJECTION, SOLUTION INFILTRATION; PERINEURAL AS NEEDED
Status: DISCONTINUED | OUTPATIENT
Start: 2019-12-23 | End: 2019-12-23 | Stop reason: HOSPADM

## 2019-12-23 RX ORDER — ASCORBIC ACID 500 MG
500 TABLET ORAL DAILY
Status: DISCONTINUED | OUTPATIENT
Start: 2019-12-24 | End: 2019-12-24 | Stop reason: HOSPADM

## 2019-12-23 RX ORDER — SODIUM CHLORIDE 0.9 % (FLUSH) 0.9 %
10 SYRINGE (ML) INJECTION AS NEEDED
Status: DISCONTINUED | OUTPATIENT
Start: 2019-12-23 | End: 2019-12-23 | Stop reason: HOSPADM

## 2019-12-23 RX ORDER — RIVASTIGMINE 9.5 MG/24H
1 PATCH, EXTENDED RELEASE TRANSDERMAL DAILY
Status: DISCONTINUED | OUTPATIENT
Start: 2019-12-24 | End: 2019-12-24 | Stop reason: HOSPADM

## 2019-12-23 RX ORDER — DIGOXIN 125 MCG
125 TABLET ORAL DAILY
Status: DISCONTINUED | OUTPATIENT
Start: 2019-12-23 | End: 2019-12-24 | Stop reason: HOSPADM

## 2019-12-23 RX ORDER — FENTANYL CITRATE 50 UG/ML
INJECTION, SOLUTION INTRAMUSCULAR; INTRAVENOUS AS NEEDED
Status: DISCONTINUED | OUTPATIENT
Start: 2019-12-23 | End: 2019-12-23 | Stop reason: HOSPADM

## 2019-12-23 RX ORDER — MIDAZOLAM HYDROCHLORIDE 1 MG/ML
INJECTION INTRAMUSCULAR; INTRAVENOUS AS NEEDED
Status: DISCONTINUED | OUTPATIENT
Start: 2019-12-23 | End: 2019-12-23 | Stop reason: HOSPADM

## 2019-12-23 RX ORDER — ONDANSETRON 2 MG/ML
INJECTION INTRAMUSCULAR; INTRAVENOUS AS NEEDED
Status: DISCONTINUED | OUTPATIENT
Start: 2019-12-23 | End: 2019-12-23 | Stop reason: HOSPADM

## 2019-12-23 RX ORDER — ONDANSETRON 2 MG/ML
4 INJECTION INTRAMUSCULAR; INTRAVENOUS EVERY 6 HOURS PRN
Status: DISCONTINUED | OUTPATIENT
Start: 2019-12-23 | End: 2019-12-24 | Stop reason: HOSPADM

## 2019-12-23 RX ORDER — PROMETHAZINE HYDROCHLORIDE 25 MG/ML
12.5 INJECTION, SOLUTION INTRAMUSCULAR; INTRAVENOUS EVERY 4 HOURS PRN
Status: DISCONTINUED | OUTPATIENT
Start: 2019-12-23 | End: 2019-12-23 | Stop reason: HOSPADM

## 2019-12-23 RX ORDER — SODIUM CHLORIDE 9 MG/ML
1 INJECTION, SOLUTION INTRAVENOUS CONTINUOUS
Status: ACTIVE | OUTPATIENT
Start: 2019-12-23 | End: 2019-12-23

## 2019-12-23 RX ORDER — PREDNISOLONE ACETATE 10 MG/ML
1 SUSPENSION/ DROPS OPHTHALMIC 3 TIMES DAILY
Status: DISCONTINUED | OUTPATIENT
Start: 2019-12-23 | End: 2019-12-24 | Stop reason: HOSPADM

## 2019-12-23 RX ORDER — SODIUM CHLORIDE 9 MG/ML
INJECTION, SOLUTION INTRAVENOUS CONTINUOUS PRN
Status: COMPLETED | OUTPATIENT
Start: 2019-12-23 | End: 2019-12-23

## 2019-12-23 RX ORDER — BUPIVACAINE HYDROCHLORIDE 5 MG/ML
INJECTION, SOLUTION EPIDURAL; INTRACAUDAL AS NEEDED
Status: DISCONTINUED | OUTPATIENT
Start: 2019-12-23 | End: 2019-12-23 | Stop reason: HOSPADM

## 2019-12-23 RX ORDER — LOSARTAN POTASSIUM 50 MG/1
50 TABLET ORAL DAILY
Status: DISCONTINUED | OUTPATIENT
Start: 2019-12-24 | End: 2019-12-24 | Stop reason: HOSPADM

## 2019-12-23 RX ORDER — LEVOTHYROXINE SODIUM 0.05 MG/1
50 TABLET ORAL
Status: DISCONTINUED | OUTPATIENT
Start: 2019-12-24 | End: 2019-12-24 | Stop reason: HOSPADM

## 2019-12-23 RX ORDER — ACETAMINOPHEN 325 MG/1
650 TABLET ORAL EVERY 4 HOURS PRN
Status: DISCONTINUED | OUTPATIENT
Start: 2019-12-23 | End: 2019-12-23 | Stop reason: HOSPADM

## 2019-12-23 RX ADMIN — PREDNISOLONE ACETATE 1 DROP: 10 SUSPENSION/ DROPS OPHTHALMIC at 21:49

## 2019-12-23 RX ADMIN — Medication 750 MG: at 09:58

## 2019-12-23 RX ADMIN — HYDROCODONE BITARTRATE AND ACETAMINOPHEN 1 TABLET: 5; 325 TABLET ORAL at 21:48

## 2019-12-23 RX ADMIN — SODIUM CHLORIDE 1 ML/KG/HR: 9 INJECTION, SOLUTION INTRAVENOUS at 08:13

## 2019-12-23 RX ADMIN — METOPROLOL TARTRATE 100 MG: 50 TABLET, FILM COATED ORAL at 21:48

## 2019-12-23 NOTE — H&P
[facility] at [facility]   H&P     Colleen Silverhier  1935    There is no work phone number on file.      12/23/19    DATE OF ADMISSION: 12/23/2019  [facility] Liza Gandara MD  2017 [place] [id number]    Chief Complaint: Chronic systolic CHF    Problem List:  1. Persistent atrial fibrillation  1. Diagnosed 2017 on event monitor  2. CHADSVASc = 4, on Eliquis  3. Intolerant to amiodarone secondary to liver dysfunction  4. Intolerant to propafenone secondary to swelling  2. Sick sinus syndrome/tachy-tom syndrome  1. S/p [employer] DDD PPM implant, 11/14/2017, Dr. Paul  3. Abnormal stress test  1. 3/26/17 myocardial perfusion study with anterior and inferior ischemia.  Normal EF.  2. Left heart catheterization 4/13/2017: Normal coronary arteries, normal LVEF  4. Chronic systolic/diastolic congestive heart failure  1. Echocardiogram 10/1/19: EF 30-35%, mild concentric LVH, mild AI, severe MR, moderate TR, mild to moderate pulmonary HTN, RVSP 49 mmHg  5. Hypertension  6. Dyslipidemia  7. DEBO  8. Surgeries:  1. Appendectomy  2. Left ovarian tumor resection  3. Partial hysterectomy  4. Left rotator cuff surgery  5. Lumpectomy, breast  6. Intestine mass resection      History of Present Illness:   Patient is an 84-year-old  female with a past medical history significant for persistent atrial fibrillation on Eliquis, SSS s/p DDD PPM implant in 2017, hypertension, dyslipidemia, and chronic systolic/diastolic congestive heart failure with reduced EF of 30 to 35% in the setting of 99% RV pacing.  She has been limited on up titration of her medical therapy secondary to hypotension.  She presents today to undergo upgrade to a biventricular pacemaker with Dr. Armenta.  From a heart failure standpoint, she is moderately symptomatic with complaints of fatigue and shortness of breath, occurring daily, with symptoms usually exacerbated by  activity and relieved by rest.  From an A. fib standpoint, she is asymptomatic without complaints of palpitations.  She is anticoagulated with Eliquis.    No Known Allergies    Prior to Admission Medications     Prescriptions Last Dose Informant Patient Reported? Taking?    apixaban (ELIQUIS) 2.5 MG tablet tablet 12/20/2019 Self Yes Yes    2.5 mg 2 (Two) Times a Day.    BUMETANIDE PO 12/23/2019 Self Yes Yes    Take 2 mg by mouth Daily.    cholecalciferol (VITAMIN D3) 1000 UNITS tablet 12/23/2019 Self Yes Yes    Take 1,000 Units by mouth Daily.    digoxin (LANOXIN) 125 MCG tablet 12/23/2019 Self Yes Yes    Take 125 mcg by mouth Daily.    levothyroxine (SYNTHROID, LEVOTHROID) 50 MCG tablet 12/23/2019 Self Yes Yes    50 mcg Daily.    losartan (COZAAR) 100 MG tablet 12/23/2019 Self Yes Yes    Take 50 mg by mouth Daily.    metoprolol tartrate (LOPRESSOR) 25 MG tablet 12/23/2019 Self Yes Yes    Take 100 mg by mouth 2 (Two) Times a Day. Takes for heart rate >115    Multiple Vitamins-Minerals (PRESERVISION AREDS 2+MULTI VIT PO) 12/23/2019  Yes Yes    Take  by mouth Daily.    omeprazole (priLOSEC) 20 MG capsule 12/23/2019 Self Yes Yes    Take 20 mg by mouth Daily.    prednisoLONE acetate (PRED FORTE) 1 % ophthalmic suspension 12/22/2019 Self Yes Yes    Administer 1 drop to both eyes 3 (Three) Times a Day.    rivastigmine (EXELON) 9.5 MG/24HR patch 12/22/2019 Self Yes Yes    Place 1 patch on the skin as directed by provider Daily.    vitamin C (ASCORBIC ACID) 500 MG tablet   Yes Yes    Take 500 mg by mouth Daily.            Current Facility-Administered Medications:   •  acetaminophen (TYLENOL) tablet 650 mg, 650 mg, Oral, Q4H PRN, Nikole Walter PA  •  nitroglycerin (NITROSTAT) SL tablet 0.4 mg, 0.4 mg, Sublingual, Q5 Min PRN, Nikole Walter PA  •  promethazine (PHENERGAN) injection 12.5 mg, 12.5 mg, Intravenous, Q4H PRN, Nikole Walter PA  •  sodium chloride 0.9 % flush 10 mL, 10 mL, Intravenous, PRN, Nikole Walter  PA  •  sodium chloride 0.9 % flush 3 mL, 3 mL, Intravenous, Q12H, Nikole Walter PA  •  sodium chloride 0.9 % infusion, 1 mL/kg/hr (Order-Specific), Intravenous, Continuous, Nikole Walter PA  •  vancomycin in dextrose 5% 150 mL (VANCOCIN) IVPB 750 mg, 15 mg/kg (Order-Specific), Intravenous, Once, Nikole Walter PA    Social History     Socioeconomic History   • Marital status:      Spouse name: Not on file   • Number of children: Not on file   • Years of education: Not on file   • Highest education level: Not on file   Tobacco Use   • Smoking status: Never Smoker   • Smokeless tobacco: Never Used   Substance and Sexual Activity   • Alcohol use: No   • Drug use: No   • Sexual activity: Defer   Social History Narrative    Patient consumes 3 servings decaf coffee weekly, no soda, decaf tea.     Patient lives at home alone.            Family History   Problem Relation Age of Onset   • Brain cancer Mother    • Colon cancer Father    • No Known Problems Sister    • No Known Problems Brother    • No Known Problems Maternal Grandmother    • No Known Problems Maternal Grandfather    • No Known Problems Paternal Grandmother    • No Known Problems Paternal Grandfather    • No Known Problems Sister    • No Known Problems Daughter    • No Known Problems Daughter    • No Known Problems Daughter        REVIEW OF SYSTEMS:   CONST:  No weight loss, fever, chills, weakness + fatigue.   HEENT:  No visual loss, blurred vision, double vision, yellow sclerae.                   No hearing loss, congestion, sore throat.   SKIN:      No rashes, urticaria, ulcers, sores.     RESP:     + shortness of breath, no hemoptysis, cough, sputum.   GI:           No anorexia, nausea, vomiting, diarrhea. No abdominal pain, melena.   :         No burning on urination, hematuria or increased frequency.  ENDO:    No diaphoresis, cold or heat intolerance. No polyuria or polydipsia.   NEURO:  No headache, dizziness, syncope, paralysis,  "ataxia, or parasthesias.                  No change in bowel or bladder control. No history of CVA/TIA  MUSC:    No muscle, back pain, joint pain or stiffness.   HEME:    No anemia, bleeding, bruising. No history of DVT/PE.  PSYCH:  No history of depression, anxiety    OBJECTIVE:    Vitals:    12/23/19 0625 12/23/19 0627   BP: 120/73 110/76   BP Location: Right arm Left arm   Patient Position: Lying Lying   Pulse: 70 69   Temp: 96.5 °F (35.8 °C)    TempSrc: Temporal    SpO2: 98% 98%   Weight: 52.5 kg (115 lb 11.9 oz)    Height: 160 cm (63\")          Vital Sign Min/Max for last 24 hours  Temp  Min: 96.5 °F (35.8 °C)  Max: 96.5 °F (35.8 °C)   BP  Min: 110/76  Max: 120/73   Pulse  Min: 69  Max: 70   No data recorded   SpO2  Min: 98 %  Max: 98 %   No data recorded    No intake or output data in the 24 hours ending 12/23/19 0722          Physical Exam:  GEN: Well nourished, well-developed, no acute distress  HEENT: Normocephalic, atraumatic, PERRLA, moist mucous membranes  NECK: Supple, No JVD, no thyromegaly, no lymphadenopathy  CARD: S1S2, RRR, no murmur, gallop, or rub  LUNGS: Clear to auscultation, normal respiratory effort  ABDOMEN: Soft, nontender, normal bowel sounds  EXTREMITIES: No gross deformities, no clubbing, cyanosis, or edema  SKIN: Warm, dry  NEURO: No focal deficits, alert and oriented x 3  PSYCHIATRIC: Normal affect and mood      Data:   Results from last 7 days   Lab Units 12/19/19  1032   WBC 10*3/mm3 5.01   HEMOGLOBIN g/dL 12.5   HEMATOCRIT % 39.6   PLATELETS 10*3/mm3 163     Results from last 7 days   Lab Units 12/19/19  1032   SODIUM mmol/L 136   POTASSIUM mmol/L 3.9   CHLORIDE mmol/L 92*   CO2 mmol/L 33.0*   BUN mg/dL 25*   CREATININE mg/dL 1.35*   GLUCOSE mg/dL 134*      Results from last 7 days   Lab Units 12/19/19  1033   HEMOGLOBIN A1C % 5.90*             Results from last 7 days   Lab Units 12/19/19  1032   PROTIME Seconds 15.3*   INR  1.27*                 No intake or output data in the 24 " hours ending 12/23/19 0722    Chest X-Ray:  Imaging Results (Last 24 Hours)     ** No results found for the last 24 hours. **          Telemetry: Underlying atrial fibrillation, V paced, HR 69-70 bpm    Assessment and Plan:   1.  Chronic systolic congestive heart failure, NYHA class III:  -Patient with reduced LVEF of 30 to 35% with NYHA class III symptoms in the setting of 99% RV pacing.  Up titration of her medications has been limited by hypotension.  We will proceed today with upgrade to Bi-V PPM with Dr. Armenta.  The risks, benefits, and alternatives to the procedure have been reviewed and the patient wishes to proceed.    2.  Persistent atrial fibrillation:  -Rate controlled and asymptomatic  -CHADSVASc 4, on Eliquis 2.5 mg twice daily (age, weight)    3.  VHD:  -Severe MR per echocardiogram, will reassess in the future after upgrade to Bi-V PPM as per #1.    4. JANET:  - Cr 1.35 - unsure of baseline (last labs in 2017 showing baseline ~0.6).  - Appears euvolemic.  Will hold Bumex and give IVF today per protocol and recheck BMP in am.      Electronically signed by SHELLIE Tirado, 12/23/19, 7:22 AM.      I, Wilfredo Armenta MD, personally performed the services face to face as described and documented by the above named individual. I have made any necessary edits and it is both accurate and complete 12/23/2019  10:59 AM

## 2019-12-24 ENCOUNTER — APPOINTMENT (OUTPATIENT)
Dept: GENERAL RADIOLOGY | Facility: HOSPITAL | Age: 84
End: 2019-12-24

## 2019-12-24 VITALS
HEART RATE: 80 BPM | RESPIRATION RATE: 18 BRPM | DIASTOLIC BLOOD PRESSURE: 75 MMHG | SYSTOLIC BLOOD PRESSURE: 130 MMHG | TEMPERATURE: 97.3 F | HEIGHT: 63 IN | BODY MASS INDEX: 19.67 KG/M2 | WEIGHT: 111 LBS | OXYGEN SATURATION: 93 %

## 2019-12-24 LAB
ANION GAP SERPL CALCULATED.3IONS-SCNC: 11 MMOL/L (ref 5–15)
BUN BLD-MCNC: 17 MG/DL (ref 8–23)
BUN/CREAT SERPL: 16.5 (ref 7–25)
CALCIUM SPEC-SCNC: 9.7 MG/DL (ref 8.6–10.5)
CHLORIDE SERPL-SCNC: 90 MMOL/L (ref 98–107)
CO2 SERPL-SCNC: 31 MMOL/L (ref 22–29)
CREAT BLD-MCNC: 1.03 MG/DL (ref 0.57–1)
GFR SERPL CREATININE-BSD FRML MDRD: 51 ML/MIN/1.73
GLUCOSE BLD-MCNC: 95 MG/DL (ref 65–99)
POTASSIUM BLD-SCNC: 4 MMOL/L (ref 3.5–5.2)
SODIUM BLD-SCNC: 132 MMOL/L (ref 136–145)

## 2019-12-24 PROCEDURE — A9270 NON-COVERED ITEM OR SERVICE: HCPCS | Performed by: NURSE PRACTITIONER

## 2019-12-24 PROCEDURE — 80048 BASIC METABOLIC PNL TOTAL CA: CPT | Performed by: NURSE PRACTITIONER

## 2019-12-24 PROCEDURE — 63710000001 METOPROLOL TARTRATE 50 MG TABLET: Performed by: NURSE PRACTITIONER

## 2019-12-24 PROCEDURE — 63710000001 LOSARTAN 50 MG TABLET: Performed by: NURSE PRACTITIONER

## 2019-12-24 PROCEDURE — G0378 HOSPITAL OBSERVATION PER HR: HCPCS

## 2019-12-24 PROCEDURE — 63710000001 DIGOXIN 125 MCG TABLET: Performed by: NURSE PRACTITIONER

## 2019-12-24 PROCEDURE — 63710000001 LEVOTHYROXINE 50 MCG TABLET: Performed by: NURSE PRACTITIONER

## 2019-12-24 PROCEDURE — 25010000002 VANCOMYCIN PER 500 MG: Performed by: INTERNAL MEDICINE

## 2019-12-24 PROCEDURE — 99024 POSTOP FOLLOW-UP VISIT: CPT | Performed by: PHYSICIAN ASSISTANT

## 2019-12-24 PROCEDURE — 63710000001 PANTOPRAZOLE 40 MG TABLET DELAYED-RELEASE: Performed by: NURSE PRACTITIONER

## 2019-12-24 PROCEDURE — 63710000001 VITAMIN C 500 MG TABLET: Performed by: NURSE PRACTITIONER

## 2019-12-24 PROCEDURE — 71046 X-RAY EXAM CHEST 2 VIEWS: CPT

## 2019-12-24 PROCEDURE — 63710000001 RIVASTIGMINE 9.5 MG/24HR PATCH 24 HOUR: Performed by: NURSE PRACTITIONER

## 2019-12-24 RX ORDER — METOPROLOL SUCCINATE 25 MG/1
25 TABLET, EXTENDED RELEASE ORAL DAILY
Qty: 30 TABLET | Refills: 6 | Status: SHIPPED | OUTPATIENT
Start: 2019-12-24 | End: 2020-02-28

## 2019-12-24 RX ADMIN — OXYCODONE HYDROCHLORIDE AND ACETAMINOPHEN 500 MG: 500 TABLET ORAL at 08:57

## 2019-12-24 RX ADMIN — LEVOTHYROXINE SODIUM 50 MCG: 50 TABLET ORAL at 06:58

## 2019-12-24 RX ADMIN — LOSARTAN POTASSIUM 50 MG: 50 TABLET, FILM COATED ORAL at 08:58

## 2019-12-24 RX ADMIN — PREDNISOLONE ACETATE 1 DROP: 10 SUSPENSION/ DROPS OPHTHALMIC at 09:09

## 2019-12-24 RX ADMIN — DIGOXIN 125 MCG: 125 TABLET ORAL at 08:58

## 2019-12-24 RX ADMIN — RIVASTIGMINE TRANSDERMAL SYSTEM 1 PATCH: 9.5 PATCH, EXTENDED RELEASE TRANSDERMAL at 08:57

## 2019-12-24 RX ADMIN — SODIUM CHLORIDE, PRESERVATIVE FREE 3 ML: 5 INJECTION INTRAVENOUS at 08:59

## 2019-12-24 RX ADMIN — VANCOMYCIN HYDROCHLORIDE 750 MG: 750 INJECTION, SOLUTION INTRAVENOUS at 08:58

## 2019-12-24 RX ADMIN — METOPROLOL TARTRATE 100 MG: 50 TABLET, FILM COATED ORAL at 08:58

## 2019-12-24 RX ADMIN — PANTOPRAZOLE SODIUM 40 MG: 40 TABLET, DELAYED RELEASE ORAL at 06:58

## 2019-12-24 NOTE — PROGRESS NOTES
Discharge Planning Assessment  [facility]     Patient Name: Colleen Benavides  MRN: [id number]  Today's Date: 12/24/2019    Admit Date: 12/23/2019    Discharge Needs Assessment     Row Name 12/24/19 1044       Living Environment    Lives With  alone    Current Living Arrangements  home/apartment/condo    Primary Care Provided by  self    Provides Primary Care For  no one    Family Caregiver if Needed  child(radha), adult    Quality of Family Relationships  helpful;involved;supportive    Able to Return to Prior Arrangements  yes       Resource/Environmental Concerns    Resource/Environmental Concerns  none       Transition Planning    Patient/Family Anticipates Transition to  home    Patient/Family Anticipated Services at Transition  none    Transportation Anticipated  family or friend will provide       Discharge Needs Assessment    Readmission Within the Last 30 Days  no previous admission in last 30 days    Concerns to be Addressed  no discharge needs identified;denies needs/concerns at this time    Equipment Currently Used at Home  cane, straight;other (see comments);bipap/cpap;grab bar;bath bench Stair lift to second floor of home    Current Discharge Risk  lives alone        Discharge Plan     Row Name 12/24/19 1042       Plan    Plan  Home    Patient/Family in Agreement with Plan  yes    Plan Comments  Met with patient in the room to initiate discharge planning. Patient lives alone in a home in [facility]. She is normally independent with ADLs and uses a cane with mobility. Patient's goal is home at discharge, and family will provide her ride. She does not anticipate any discharge needs. Patient has discharge orders for home.     Final Discharge Disposition Code  01 - home or self-care        Destination      Coordination has not been started for this encounter.      Durable Medical Equipment      Coordination has not been started for this encounter.      Dialysis/Infusion      Coordination has not been  started for this encounter.      Home Medical Care      Coordination has not been started for this encounter.      Therapy      Coordination has not been started for this encounter.      Community Resources      Coordination has not been started for this encounter.        Expected Discharge Date and Time     Expected Discharge Date Expected Discharge Time    Dec 24, 2019         Demographic Summary     Row Name 12/24/19 1043       General Information    Admission Type  observation    Referral Source  admission list    Reason for Consult  discharge planning    General Information Comments  PCP is Liza Pacheco       Contact Information    Permission Granted to Share Info With  ;family/designee daughter, Loretta Lane        Functional Status     Row Name 12/24/19 1043       Functional Status    Usual Activity Tolerance  moderate       Functional Status, IADL    Medications  independent    Meal Preparation  independent    Housekeeping  independent    Laundry  independent    Shopping  independent       Employment/    Employment/ Comments  Confirmed with patient that she has medical and rx coverage [facility] [employer].         Psychosocial    No documentation.       Abuse/Neglect    No documentation.       Legal    No documentation.       Substance Abuse    No documentation.       Patient Forms    No documentation.           Angeles Horta RN

## 2019-12-24 NOTE — DISCHARGE SUMMARY
Cardiology Discharge Summary     Patient ID:  Colleen Benavides  [id number]  84 y.o.  1935    Admit date: 12/23/2019    Discharge date and time: No discharge date for patient encounter.     Admitting Physician: Wilfredo Armenta MD     Primary Physician: Liza Pacheco MD    Discharge Provider:  Best Cortez PA-C    Admission Diagnoses: Chronic systolic (congestive) heart failure (CMS/HCC) [I50.22]  Chronic systolic (congestive) heart failure (CMS/HCC) [I50.22]    Discharge Diagnoses: Cardiology Procedures this admission:    1. Persistent atrial fibrillation  1. Diagnosed 2017 on event monitor  2. CHADSVASc = 4, on Eliquis  3. Intolerant to amiodarone secondary to liver dysfunction  4. Intolerant to propafenone secondary to swelling  2. Sick sinus syndrome/tachy-tom syndrome  1. S/p [employer] DDD PPM implant, 11/14/2017, Dr. Paul  3. Abnormal stress test  1. 3/26/17 myocardial perfusion study with anterior and inferior ischemia.  Normal EF.  2. Left heart catheterization 4/13/2017: Normal coronary arteries, normal LVEF  4. Chronic systolic/diastolic congestive heart failure  1. Echocardiogram 10/1/19: EF 30-35%, mild concentric LVH, mild AI, severe MR, moderate TR, mild to moderate pulmonary HTN, RVSP 49 mmHg  5. Hypertension  6. Dyslipidemia  7. DEBO  8. Surgeries:  1. Appendectomy  2. Left ovarian tumor resection  3. Partial hysterectomy  4. Left rotator cuff surgery  5. Lumpectomy, breast  6. Intestine mass resection       Hospital Course:   84-year-old female with history of atrial fibrillation previous sick sinus syndrome dual-chamber pacemaker implant 2017.  She has a past medical history of hypertension dyslipidemia chronic systolic heart failure.  Recent echocardiogram revealed EF 30%.  She was RV pacing 90%.  Despite managing medical therapy she continued to have low EF and symptoms of heart failure therefore is recommended she undergo biventricular pacemaker device placement with   Kathi.  The procedure was occluded yesterday without complication.  She tolerated procedure well septal chest x-ray yesterday.  Patient is eager to be discharged home in 50 do so.      EXAMINATION: XR CHEST PA AND LATERAL-   INDICATION: ARTIFICIAL CARDIAC PACEMAKER PRESENT   TECHNIQUE:   COMPARISON: 11/14/2017   FINDNGS: There appears to be a new left-sided pacemaker, with addition  of a coronary sinus lead since the prior exam. Heart is enlarged.  Vasculature is cephalized. There may be very early changes of  interstitial edema with small curly B lines now seen in the lateral  costophrenic angles. A small area of discoid atelectasis left base. No  lung consolidation or pneumothorax is seen. Prominent trace left  effusion.   IMPRESSION:  1. Pacemaker generator and lead revision. No evidence of pneumothorax.  2. Mild pulmonary vascular congestion and trace left pleural effusion.      PROCEDURES: The device was set at DDDR with BiV pacing on, a lower rate of 70 beats per minute and upper rate of 120 beats per minute with set A-V delays.    FINAL IMPRESSIONS:    1. Successful upgrade to a biventricular pacemaker system  2. Successful insertion of a coronary sinus pacing lead.  RECOMMENDATION(S):  1. The patient will be monitored on telemetry.  Will restart Eliquis on Wednesday a.m.  Wound check in 7 to 10 days with follow-up with Dr. Conchis aPul in 3 months  2. If stable, the patient will be discharged to home in the morning.    Wilfredo Armenta MD  12/23/19    Discharge Exam:     Vitals:    12/24/19 0730   BP: 130/75   Pulse: 80   Resp: 18   Temp: 97.3 °F (36.3 °C)   SpO2: 93%     General-Well Nourished, Well developed  Eyes - PERRLA  Neck- supple, No mass  CV- regular rate and rhythm, no MRG. Pacemaker site stable no hematoma  Lung- clear bilaterally  Abd- soft, +BS  Musc/skel - Norm strength and range of motion  Skin- warm and dry  Neuro - Alert & Oriented x 3, appropriate mood.    Disposition:    Home    Patient Instructions:      Your medication list      START taking these medications      Instructions Last Dose Given Next Dose Due   metoprolol succinate XL 25 MG 24 hr tablet  Commonly known as:  TOPROL-XL      Take 1 tablet by mouth Daily.          CONTINUE taking these medications      Instructions Last Dose Given Next Dose Due   BUMETANIDE PO      Take 2 mg by mouth Daily.       cholecalciferol 25 MCG (1000 UT) tablet  Commonly known as:  VITAMIN D3      Take 1,000 Units by mouth Daily.       digoxin 125 MCG tablet  Commonly known as:  LANOXIN      Take 125 mcg by mouth Daily.       ELIQUIS 2.5 MG tablet tablet  Generic drug:  apixaban      2.5 mg 2 (Two) Times a Day.       levothyroxine 50 MCG tablet  Commonly known as:  SYNTHROID, LEVOTHROID      50 mcg Daily.       losartan 100 MG tablet  Commonly known as:  COZAAR      Take 50 mg by mouth Daily.       omeprazole 20 MG capsule  Commonly known as:  priLOSEC      Take 20 mg by mouth Daily.       prednisoLONE acetate 1 % ophthalmic suspension  Commonly known as:  PRED FORTE      Administer 1 drop to both eyes 3 (Three) Times a Day.       PRESERVISION AREDS 2+MULTI VIT PO      Take  by mouth Daily.       rivastigmine 9.5 MG/24HR patch  Commonly known as:  EXELON      Place 1 patch on the skin as directed by provider Daily.       vitamin C 500 MG tablet  Commonly known as:  ASCORBIC ACID      Take 500 mg by mouth Daily.          STOP taking these medications    metoprolol tartrate 25 MG tablet  Commonly known as:  LOPRESSOR              Where to Get Your Medications      These medications were sent to [facility]/pharmacy #[id number] - [place], [place] - [facility] [place] [address] [facility] - [phone]  - [phone]   [address] [facility] [address]    Phone:  [phone]   · metoprolol succinate XL 25 MG 24 hr tablet       Referenced discharge instructions provided by nursing for diet and activity.    Follow-up wound check 7-10 days  Follow up Device  check 3 months.     Signed:  ABILIO Carpenter  12/24/2019  10:10 AM

## 2019-12-25 ENCOUNTER — READMISSION MANAGEMENT (OUTPATIENT)
Dept: CALL CENTER | Facility: HOSPITAL | Age: 84
End: 2019-12-25

## 2019-12-25 NOTE — OUTREACH NOTE
Prep Survey      Responses   Facility patient discharged from?  [facility]   Is patient eligible?  Yes   Discharge diagnosis  Persistent atrial fibrillation biventricular pacemaker device placement    Does the patient have one of the following disease processes/diagnoses(primary or secondary)?  Other   Does the patient have Home health ordered?  No   Is there a DME ordered?  No   Comments regarding appointments  See AVS   Prep survey completed?  Yes          Marce Hoyt RN

## 2019-12-26 ENCOUNTER — NURSE TRIAGE (OUTPATIENT)
Dept: CALL CENTER | Facility: HOSPITAL | Age: 84
End: 2019-12-26

## 2019-12-26 NOTE — TELEPHONE ENCOUNTER
"Mother had a pacemaker. Caller asking if dressing needs to be changed. No specific wound care instruction other than not to get it wet. Jaky says it is gauze with a big clear plastic on it. No new drainage. AVS accessed and no specific written instructions noted. Advised to call MD office for instructions.    Reason for Disposition  • Requesting regular office appointment    Additional Information  • Negative: [1] Caller is not with the adult (patient) AND [2] reporting urgent symptoms  • Negative: Lab result questions  • Negative: Medication questions  • Negative: Caller can't be reached by phone  • Negative: Caller has already spoken to PCP or another triager  • Negative: RN needs further essential information from caller in order to complete triage    Answer Assessment - Initial Assessment Questions  1. REASON FOR CALL or QUESTION: \"What is your reason for calling today?\" or \"How can I best help you?\" or \"What question do you have that I can help answer?\"  Pacemaker dressing    Protocols used: INFORMATION ONLY CALL-ADULT-      "

## 2019-12-28 ENCOUNTER — READMISSION MANAGEMENT (OUTPATIENT)
Dept: CALL CENTER | Facility: HOSPITAL | Age: 84
End: 2019-12-28

## 2019-12-28 NOTE — OUTREACH NOTE
Medical Week 1 Survey      Responses   Facility patient discharged from?  [facility]   Does the patient have one of the following disease processes/diagnoses(primary or secondary)?  Other   Is there a successful TCM telephone encounter documented?  No   Week 1 attempt successful?  Yes   Call start time  1231   Call end time  1252   Discharge diagnosis  Persistent atrial fibrillation biventricular pacemaker device placement    Is patient permission given to speak with other caregiver?  Yes   List who call center can speak with  Loretta Lane-daughter   Person spoke with today (if not patient) and relationship  Loretta-daughter   Meds reviewed with patient/caregiver?  Yes   Is the patient having any side effects they believe may be caused by any medication additions or changes?  No   Does the patient have all medications ordered at discharge?  Yes   Is the patient taking all medications as directed (includes completed medication regime)?  Yes   Does the patient have a primary care provider?   Yes   Does the patient have an appointment with their PCP within 7 days of discharge?  N/A   Comments regarding PCP  Pt is to call as needed   Has the patient kept scheduled appointments due by today?  N/A   Comments  Appointment with cardiology for wound check on 1/2/20   Psychosocial issues?  No   Did the patient receive a copy of their discharge instructions?  Yes   Nursing interventions  Reviewed instructions with patient   What is the patient's perception of their health status since discharge?  Improving   Is the patient/caregiver able to teach back signs and symptoms related to disease process for when to call PCP?  Yes   Is the patient/caregiver able to teach back signs and symptoms related to disease process for when to call 911?  Yes   Is the patient/caregiver able to teach back the hierarchy of who to call/visit for symptoms/problems? PCP, Specialist, Home health nurse, Urgent Care, ED, 911  Yes   If the patient is a current  smoker, are they able to teach back resources for cessation?  -- [Pt is a nonsmoker]   Week 1 call completed?  Yes   Wrap up additional comments  Pt's incision is still covered. She reports some drainage on bandage-nothing increased. She tries not to lift objects and lift her arm over her head, but she lives alone. Daughter Loretta helps pt.          Althea Hunter RN

## 2020-01-02 ENCOUNTER — OFFICE VISIT (OUTPATIENT)
Dept: CARDIOLOGY | Facility: CLINIC | Age: 85
End: 2020-01-02

## 2020-01-02 DIAGNOSIS — I49.5 SICK SINUS SYNDROME (HCC): ICD-10-CM

## 2020-01-02 DIAGNOSIS — R00.1 PERSISTENT SINUS BRADYCARDIA: ICD-10-CM

## 2020-01-02 DIAGNOSIS — I48.19 PERSISTENT ATRIAL FIBRILLATION (HCC): ICD-10-CM

## 2020-01-02 DIAGNOSIS — I50.22 CHRONIC SYSTOLIC (CONGESTIVE) HEART FAILURE (HCC): ICD-10-CM

## 2020-01-02 PROCEDURE — 99024 POSTOP FOLLOW-UP VISIT: CPT | Performed by: INTERNAL MEDICINE

## 2020-01-02 NOTE — PROGRESS NOTES
WOUND CHECK    2020    Colleen Benavides, : 1935    WOUND CHECK    B/P:  (Sitting)  (Standing)     Pulse:     Patient has fever: [] Temperature if indicated:     Wound Location: left I nfraclavicular    Dressing Removed []        Old Dressing Appearance:  Clean, dry [x]                 Old, bloody drainage []                            Moist, serous drainage []                Moist, thick yellow/green drainage []       Wound Appearance: Redness []                  Drainage []                  Culture obtained []        Color: N/A     Consistency: none     Amount: none         Gloves used, wound cleansed with sterile 4x4 and peroxide [x]       MD notified [] MD orders:     Antibiotic started []  If checked, type   Other:     Appointment for follow-up scheduled for 3 months post procedure [x]    No future appointments.        Tiffanie Cordoba MA, 20      MD Signature:______________________________ Completed By/Date:

## 2020-02-28 RX ORDER — METOPROLOL SUCCINATE 25 MG/1
TABLET, EXTENDED RELEASE ORAL
Qty: 30 TABLET | Refills: 11 | Status: SHIPPED | OUTPATIENT
Start: 2020-02-28 | End: 2023-03-08 | Stop reason: ALTCHOICE

## 2023-01-29 ENCOUNTER — TRANSCRIBE ORDERS (OUTPATIENT)
Dept: CARDIOLOGY | Facility: CLINIC | Age: 88
End: 2023-01-29
Payer: MEDICARE

## 2023-01-29 DIAGNOSIS — I34.0 NONRHEUMATIC MITRAL VALVE REGURGITATION: Primary | ICD-10-CM

## 2023-03-08 ENCOUNTER — OFFICE VISIT (OUTPATIENT)
Dept: CARDIOLOGY | Facility: CLINIC | Age: 88
End: 2023-03-08
Payer: MEDICARE

## 2023-03-08 VITALS
BODY MASS INDEX: 24.45 KG/M2 | DIASTOLIC BLOOD PRESSURE: 64 MMHG | HEART RATE: 72 BPM | OXYGEN SATURATION: 96 % | WEIGHT: 138 LBS | HEIGHT: 63 IN | SYSTOLIC BLOOD PRESSURE: 128 MMHG

## 2023-03-08 DIAGNOSIS — Z95.0 PRESENCE OF CARDIAC PACEMAKER: ICD-10-CM

## 2023-03-08 DIAGNOSIS — I35.0 NON-RHEUMATIC AORTIC STENOSIS: ICD-10-CM

## 2023-03-08 DIAGNOSIS — I48.19 PERSISTENT ATRIAL FIBRILLATION: Primary | ICD-10-CM

## 2023-03-08 DIAGNOSIS — I08.0 MITRAL AND AORTIC VALVE DISEASE: ICD-10-CM

## 2023-03-08 DIAGNOSIS — I50.22 CHRONIC SYSTOLIC (CONGESTIVE) HEART FAILURE: ICD-10-CM

## 2023-03-08 PROCEDURE — 93288 INTERROG EVL PM/LDLS PM IP: CPT | Performed by: NURSE PRACTITIONER

## 2023-03-08 PROCEDURE — 99214 OFFICE O/P EST MOD 30 MIN: CPT | Performed by: NURSE PRACTITIONER

## 2023-03-08 RX ORDER — METOPROLOL SUCCINATE 50 MG/1
50 TABLET, EXTENDED RELEASE ORAL DAILY
Qty: 90 TABLET | Refills: 1 | Status: SHIPPED | OUTPATIENT
Start: 2023-03-08

## 2023-03-08 RX ORDER — MEMANTINE HYDROCHLORIDE 10 MG/1
TABLET ORAL
COMMUNITY
Start: 2023-01-28

## 2023-03-08 RX ORDER — METOPROLOL SUCCINATE 50 MG/1
1 TABLET, EXTENDED RELEASE ORAL DAILY
COMMUNITY
Start: 2023-01-01 | End: 2023-03-08 | Stop reason: SDUPTHER

## 2023-03-08 RX ORDER — BUMETANIDE 1 MG/1
1 TABLET ORAL DAILY
Qty: 90 TABLET | Refills: 1 | Status: SHIPPED | OUTPATIENT
Start: 2023-03-08

## 2023-03-08 RX ORDER — LOSARTAN POTASSIUM 25 MG/1
25 TABLET ORAL DAILY
Qty: 90 TABLET | Refills: 1 | Status: SHIPPED | OUTPATIENT
Start: 2023-03-08

## 2023-03-08 RX ORDER — BUMETANIDE 1 MG/1
1 TABLET ORAL DAILY
COMMUNITY
Start: 2023-01-29 | End: 2023-03-08 | Stop reason: SDUPTHER

## 2023-03-08 RX ORDER — LOSARTAN POTASSIUM 25 MG/1
1 TABLET ORAL DAILY
COMMUNITY
Start: 2023-01-29 | End: 2023-03-08 | Stop reason: SDUPTHER

## 2023-03-09 PROBLEM — L03.317 CELLULITIS AND ABSCESS OF BUTTOCK: Status: RESOLVED | Noted: 2023-03-09 | Resolved: 2023-03-09

## 2023-03-09 PROBLEM — I38 VALVULAR HEART DISEASE: Status: RESOLVED | Noted: 2019-11-13 | Resolved: 2023-03-09

## 2023-03-09 PROBLEM — I08.0 MITRAL AND AORTIC VALVE DISEASE: Status: ACTIVE | Noted: 2023-03-09

## 2023-03-09 PROBLEM — L02.31 CELLULITIS AND ABSCESS OF BUTTOCK: Status: RESOLVED | Noted: 2023-03-09 | Resolved: 2023-03-09

## 2023-03-09 PROBLEM — K52.9 COLITIS: Status: ACTIVE | Noted: 2023-03-09

## 2023-03-09 PROBLEM — M19.049 DEGENERATIVE JOINT DISEASE OF HAND: Status: ACTIVE | Noted: 2023-03-09

## 2023-03-09 PROBLEM — S90.30XA CONTUSION OF FOOT: Status: ACTIVE | Noted: 2023-03-09

## 2023-03-09 PROBLEM — R00.1 PERSISTENT SINUS BRADYCARDIA: Status: RESOLVED | Noted: 2017-11-06 | Resolved: 2023-03-09

## 2023-03-09 PROBLEM — M19.039 OSTEOARTHRITIS OF WRIST: Status: ACTIVE | Noted: 2023-03-09

## 2023-03-09 PROBLEM — L03.119 CELLULITIS OF LEG, EXCEPT FOOT: Status: RESOLVED | Noted: 2023-03-09 | Resolved: 2023-03-09

## 2023-03-09 PROBLEM — N75.1 BARTHOLIN'S GLAND ABSCESS: Status: RESOLVED | Noted: 2023-03-09 | Resolved: 2023-03-09

## 2023-03-09 PROBLEM — I48.91 ATRIAL FIBRILLATION (HCC): Status: RESOLVED | Noted: 2018-05-14 | Resolved: 2023-03-09

## 2023-03-09 PROBLEM — E78.5 HYPERLIPIDEMIA: Status: ACTIVE | Noted: 2023-03-09

## 2023-03-09 PROBLEM — Z95.0 PRESENCE OF CARDIAC PACEMAKER: Status: ACTIVE | Noted: 2023-03-09

## 2023-03-09 PROBLEM — L02.31 CELLULITIS AND ABSCESS OF BUTTOCK: Status: ACTIVE | Noted: 2023-03-09

## 2023-03-09 PROBLEM — N75.1 BARTHOLIN'S GLAND ABSCESS: Status: ACTIVE | Noted: 2023-03-09

## 2023-03-09 PROBLEM — L03.119 CELLULITIS OF LEG, EXCEPT FOOT: Status: ACTIVE | Noted: 2023-03-09

## 2023-03-09 PROBLEM — R60.9 EDEMA: Status: ACTIVE | Noted: 2023-03-09

## 2023-03-09 PROBLEM — L03.317 CELLULITIS AND ABSCESS OF BUTTOCK: Status: ACTIVE | Noted: 2023-03-09

## 2023-03-09 PROBLEM — K21.9 GASTROESOPHAGEAL REFLUX DISEASE: Status: ACTIVE | Noted: 2023-03-09

## 2023-03-09 PROBLEM — S90.30XA CONTUSION OF FOOT: Status: RESOLVED | Noted: 2023-03-09 | Resolved: 2023-03-09

## 2023-03-09 PROBLEM — R19.00 PELVIC MASS: Status: ACTIVE | Noted: 2023-03-09

## 2023-03-09 PROBLEM — K52.9 COLITIS: Status: RESOLVED | Noted: 2023-03-09 | Resolved: 2023-03-09

## 2023-03-09 PROBLEM — I48.91 ATRIAL FIBRILLATION (HCC): Status: ACTIVE | Noted: 2018-05-14

## 2023-03-09 PROBLEM — B35.9 DERMATOPHYTOSIS: Status: ACTIVE | Noted: 2023-03-09

## 2023-03-09 RX ORDER — ESCITALOPRAM OXALATE 5 MG/1
TABLET ORAL
COMMUNITY

## 2023-03-09 RX ORDER — ERYTHROMYCIN 5 MG/G
OINTMENT OPHTHALMIC
COMMUNITY

## 2023-03-09 RX ORDER — TOBRAMYCIN AND DEXAMETHASONE 3; 1 MG/ML; MG/ML
SUSPENSION/ DROPS OPHTHALMIC
COMMUNITY

## 2023-03-09 RX ORDER — PHENOL 1.4 %
AEROSOL, SPRAY (ML) MUCOUS MEMBRANE EVERY 24 HOURS
COMMUNITY

## 2023-03-10 NOTE — ASSESSMENT & PLAN NOTE
- September 28, 2022 echo reviewed.  EF 30 to 35%, mild concentric left ventricular hypertrophy, moderate aortic regurgitation, severe mitral regurgitation, mild tricuspid regurgitation.    -January 29, 2023 echo with an EF 33%, moderate aortic valve regurgitation, mild aortic valve stenosis, severe mitral valve regurgitation.    - Patient and family are not interested in surgical interventions at this time.  They are okay with treating symptomatically and serial echoes.  They have opted for annual echoes instead of every 6 months.

## 2023-03-10 NOTE — PROGRESS NOTES
[facility] Provider Note     Date:   2023   Name: Colleen Benavides  :   1935  PCP: Liza Pacheco MD    Chief Complaint   Patient presents with   • Follow-up     Follow PM with Echo       Subjective     History of Present Illness  oClleen Benavides is a 87 y.o. female who presents today for pacemaker check.  She has coexisting sick sinus syndrome and atrial fibrillation.  She is accompanied by her 2 daughters today.  Dr. Paul discussed patient's mitral valve with family last visit and family reported they do not want to pursue treatment at this time.  They still would like to just follow this with serial echoes and symptomatically as we are able.  The patient and her daughters agree with but they still want to continue this plan.    Pacemaker interrogated in clinic today.  Showing good leads in good battery.  Patient has approximately 4.4 years left on battery.  It is a [employer] BiV PPM with mode VVIR at a rate of 75.  No events.    Patient reports she is feeling good.  No chest pain, no significant shortness of air, no palpitations.    2022 echo reviewed.  EF 30 to 35%, mild concentric left ventricular hypertrophy, moderate aortic regurgitation, severe mitral regurgitation, mild tricuspid regurgitation.    2023 echo with an EF 33%, moderate aortic valve regurgitation, mild aortic valve stenosis, severe mitral valve regurgitation.    2017 bilateral carotid duplex less than 50%    ..[id number] SCORE   [id number] Score: 5 (3/9/2023 11:19 PM)                                               No Known Allergies    Current Outpatient Medications:   •  apixaban (ELIQUIS) 2.5 MG tablet tablet, Take 1 tablet by mouth 2 (Two) Times a Day., Disp: 180 tablet, Rfl: 3  •  bumetanide (BUMEX) 1 MG tablet, Take 1 tablet by mouth Daily., Disp: 90 tablet, Rfl: 1  •  losartan (COZAAR) 25 MG tablet, Take 1 tablet by mouth Daily., Disp: 90 tablet,  Rfl: 1  •  memantine (NAMENDA) 10 MG tablet, TAKE 1 TABLET BY MOUTH TWICE A DAY FOR 90 DAYS, Disp: , Rfl:   •  metoprolol succinate XL (TOPROL-XL) 50 MG 24 hr tablet, Take 1 tablet by mouth Daily., Disp: 90 tablet, Rfl: 1  •  erythromycin (ROMYCIN) 5 MG/GM ophthalmic ointment, erythromycin 5 mg/gram (0.5 %) eye ointment  PLACE A 1/4-INCH RIBBON INTO THE LEFT EYE EVERY NIGHT AT BEDTIME, Disp: , Rfl:   •  escitalopram (LEXAPRO) 5 MG tablet, escitalopram 5 mg tablet, Disp: , Rfl:   •  Melatonin 10 MG tablet, Daily., Disp: , Rfl:   •  Multiple Vitamins-Minerals (PRESERVISION AREDS 2+MULTI VIT PO), PreserVision AREDS  1 po bid, Disp: , Rfl:   •  tobramycin-dexamethasone (TOBRADEX) 0.3-0.1 % ophthalmic suspension, tobramycin 0.3 %-dexamethasone 0.1 % eye drops,suspension  INSTILL 1 DROP INTO THE RIGHT EYE 4 TIMES A DAY AFTER WARM COMPRESSES, Disp: , Rfl:     Past Medical History:   Diagnosis Date   • Abnormal heart rhythm    • Aortic regurgitation    • Aortic valve stenosis    • Arrhythmia     PAF   • Cellulitis and abscess of buttock 3/9/2023   • Cellulitis of leg, except foot 3/9/2023   • Chronic atrial fibrillation (HCC)    • Chronic systolic (congestive) heart failure (HCC)    • Colitis 3/9/2023   • Constipation    • Contusion of foot 3/9/2023   • Fibromyalgia    • Frequent headaches    • Hypercholesterolemia    • Hypertension    • Irregular heart rhythm    • Macular degeneration    • Mitral valve insufficiency    • MRSA infection     within the last 6 years   • Obstructive sleep apnea (adult) (pediatric)    • DEBO (obstructive sleep apnea)     BASELINE AHI 26, TREATED WITH CPAP   • Pneumonia    • Presence of cardiac pacemaker    • Sleep apnea     wears cpap   • Tachy-tom syndrome (HCC)    • Thyroid disorder    • Wears glasses    • Wears partial dentures       Past Surgical History:   Procedure Laterality Date   • APPENDECTOMY     • BREAST LUMPECTOMY     • CARDIAC CATHETERIZATION N/A 4/13/2017    Procedure: Left  "Heart Cath;  Surgeon: Lee Lazo MD;  Location:  [facility];  Service:    • CARDIAC ELECTROPHYSIOLOGY PROCEDURE N/A 11/14/2017    Procedure: Pacemaker DC new;  Surgeon: Danika Paul MD;  Location:  [facility];  Service:    • CARDIAC ELECTROPHYSIOLOGY PROCEDURE N/A 12/23/2019    Procedure: Biventricular Device Upgrade, hold Eliquis 1 day;  Surgeon: Wilfredo Armenta MD;  Location:  [facility];  Service: Cardiology   • COLONOSCOPY     • HYSTERECTOMY     • OVARIAN CYST REMOVAL Left    • ROTATOR CUFF REPAIR Left    • SMALL INTESTINE SURGERY       Family History   Problem Relation Age of Onset   • Brain cancer Mother    • Colon cancer Father    • No Known Problems Sister    • No Known Problems Brother    • No Known Problems Maternal Grandmother    • No Known Problems Maternal Grandfather    • No Known Problems Paternal Grandmother    • No Known Problems Paternal Grandfather    • No Known Problems Sister    • No Known Problems Daughter    • No Known Problems Daughter    • No Known Problems Daughter      Social History     Socioeconomic History   • Marital status:    • Number of children: 3   Tobacco Use   • Smoking status: Never   • Smokeless tobacco: Never   Substance and Sexual Activity   • Alcohol use: No   • Drug use: No   • Sexual activity: Defer       Objective     Vital Signs:  /64 (BP Location: Left arm)   Pulse 72   Ht 160 cm (63\")   Wt 62.6 kg (138 lb) Comment: 138 lb  SpO2 96%   BMI 24.45 kg/m²   Estimated body mass index is 24.45 kg/m² as calculated from the following:    Height as of this encounter: 160 cm (63\").    Weight as of this encounter: 62.6 kg (138 lb).       BMI is within normal parameters. No other follow-up for BMI required.      Physical Exam  Cardiovascular:      Rate and Rhythm: Normal rate. Rhythm irregular.      Heart sounds: Normal heart sounds.   Pulmonary:      Effort: Pulmonary effort is normal.      Breath " sounds: Normal breath sounds.   Skin:     General: Skin is warm and dry.   Neurological:      Mental Status: She is alert and oriented to person, place, and time.   Psychiatric:         Mood and Affect: Mood normal.                     Assessment and Plan     Diagnoses and all orders for this visit:    1. Persistent atrial fibrillation (Primary)  Assessment & Plan:  - Rate controlled.  On metoprolol and Eliquis.    Orders:  -     losartan (COZAAR) 25 MG tablet; Take 1 tablet by mouth Daily.  Dispense: 90 tablet; Refill: 1  -     metoprolol succinate XL (TOPROL-XL) 50 MG 24 hr tablet; Take 1 tablet by mouth Daily.  Dispense: 90 tablet; Refill: 1  -     apixaban (ELIQUIS) 2.5 MG tablet tablet; Take 1 tablet by mouth 2 (Two) Times a Day.  Dispense: 180 tablet; Refill: 3    2. Non-rheumatic aortic stenosis  Assessment & Plan:  - September 28, 2022 echo reviewed.  EF 30 to 35%, mild concentric left ventricular hypertrophy, moderate aortic regurgitation, severe mitral regurgitation, mild tricuspid regurgitation.    --January 29, 2023 echo with an EF 33%, moderate aortic valve regurgitation, mild aortic valve stenosis, severe mitral valve regurgitation.    - Patient and family are not interested in surgical interventions at this time.  They are okay with treating symptomatically and serial echoes.  They have opted for annual echoes instead of every 6 months.    Orders:  -     losartan (COZAAR) 25 MG tablet; Take 1 tablet by mouth Daily.  Dispense: 90 tablet; Refill: 1  -     metoprolol succinate XL (TOPROL-XL) 50 MG 24 hr tablet; Take 1 tablet by mouth Daily.  Dispense: 90 tablet; Refill: 1    3. Mitral and aortic valve disease  Assessment & Plan:  - September 28, 2022 echo reviewed.  EF 30 to 35%, mild concentric left ventricular hypertrophy, moderate aortic regurgitation, severe mitral regurgitation, mild tricuspid regurgitation.    -January 29, 2023 echo with an EF 33%, moderate aortic valve regurgitation, mild aortic  valve stenosis, severe mitral valve regurgitation.    - Patient and family are not interested in surgical interventions at this time.  They are okay with treating symptomatically and serial echoes.  They have opted for annual echoes instead of every 6 months.    Orders:  -     losartan (COZAAR) 25 MG tablet; Take 1 tablet by mouth Daily.  Dispense: 90 tablet; Refill: 1  -     metoprolol succinate XL (TOPROL-XL) 50 MG 24 hr tablet; Take 1 tablet by mouth Daily.  Dispense: 90 tablet; Refill: 1    4. Presence of cardiac pacemaker  Assessment & Plan:  - Interrogated in clinic today.  Good battery and good leads.  Battery expectancy approximately 4.4 years.  No events seen.      5. Chronic systolic (congestive) heart failure (HCC)  Assessment & Plan:  - September 28, 2022 echo reviewed.  EF 30 to 35%, mild concentric left ventricular hypertrophy, moderate aortic regurgitation, severe mitral regurgitation, mild tricuspid regurgitation.    - -January 29, 2023 echo with an EF 33%, moderate aortic valve regurgitation, mild aortic valve stenosis, severe mitral valve regurgitation.    - Patient and family are not interested in surgical interventions at this time.  They are okay with treating symptomatically and serial echoes.  They have opted for annual echoes instead of every 6 months.    Orders:  -     bumetanide (BUMEX) 1 MG tablet; Take 1 tablet by mouth Daily.  Dispense: 90 tablet; Refill: 1      Recommendations: ER if symptoms increase, Sleep hygiene discussed, Stop cigarettes, Limit caffeine and Report if any new/changing symptoms immediately          Follow Up  Return in about 6 months (around 9/8/2023) for pacemaker check with bashir.  Patient was given instructions and counseling regarding her condition or for health maintenance advice. Please see specific information pulled into the AVS if appropriate.

## 2023-03-10 NOTE — ASSESSMENT & PLAN NOTE
- September 28, 2022 echo reviewed.  EF 30 to 35%, mild concentric left ventricular hypertrophy, moderate aortic regurgitation, severe mitral regurgitation, mild tricuspid regurgitation.    - -January 29, 2023 echo with an EF 33%, moderate aortic valve regurgitation, mild aortic valve stenosis, severe mitral valve regurgitation.    - Patient and family are not interested in surgical interventions at this time.  They are okay with treating symptomatically and serial echoes.  They have opted for annual echoes instead of every 6 months.

## 2023-03-10 NOTE — ASSESSMENT & PLAN NOTE
- September 28, 2022 echo reviewed.  EF 30 to 35%, mild concentric left ventricular hypertrophy, moderate aortic regurgitation, severe mitral regurgitation, mild tricuspid regurgitation.    --January 29, 2023 echo with an EF 33%, moderate aortic valve regurgitation, mild aortic valve stenosis, severe mitral valve regurgitation.    - Patient and family are not interested in surgical interventions at this time.  They are okay with treating symptomatically and serial echoes.  They have opted for annual echoes instead of every 6 months.

## 2023-03-10 NOTE — ASSESSMENT & PLAN NOTE
- Interrogated in clinic today.  Good battery and good leads.  Battery expectancy approximately 4.4 years.  No events seen.

## 2023-07-26 DIAGNOSIS — I08.0 MITRAL AND AORTIC VALVE DISEASE: ICD-10-CM

## 2023-07-26 DIAGNOSIS — I48.19 PERSISTENT ATRIAL FIBRILLATION: ICD-10-CM

## 2023-07-26 DIAGNOSIS — I35.0 NON-RHEUMATIC AORTIC STENOSIS: ICD-10-CM

## 2023-07-26 RX ORDER — METOPROLOL SUCCINATE 50 MG/1
TABLET, EXTENDED RELEASE ORAL
Qty: 90 TABLET | Refills: 1 | Status: SHIPPED | OUTPATIENT
Start: 2023-07-26

## 2023-08-05 DIAGNOSIS — I35.0 NON-RHEUMATIC AORTIC STENOSIS: ICD-10-CM

## 2023-08-05 DIAGNOSIS — I08.0 MITRAL AND AORTIC VALVE DISEASE: ICD-10-CM

## 2023-08-05 DIAGNOSIS — I48.19 PERSISTENT ATRIAL FIBRILLATION: ICD-10-CM

## 2023-08-07 RX ORDER — LOSARTAN POTASSIUM 25 MG/1
TABLET ORAL
Qty: 90 TABLET | Refills: 1 | Status: SHIPPED | OUTPATIENT
Start: 2023-08-07

## 2023-10-23 DIAGNOSIS — I50.22 CHRONIC SYSTOLIC (CONGESTIVE) HEART FAILURE: ICD-10-CM

## 2023-10-23 RX ORDER — BUMETANIDE 1 MG/1
1 TABLET ORAL DAILY
Qty: 90 TABLET | Refills: 1 | Status: SHIPPED | OUTPATIENT
Start: 2023-10-23

## 2025-08-12 LAB
MDC_IDC_MSMT_BATTERY_REMAINING_LONGEVITY: 25 MO
MDC_IDC_MSMT_BATTERY_REMAINING_PERCENTAGE: 26 %
MDC_IDC_MSMT_BATTERY_RRT_TRIGGER: 2.62
MDC_IDC_MSMT_BATTERY_STATUS: NORMAL
MDC_IDC_MSMT_BATTERY_VOLTAGE: 2.92
MDC_IDC_MSMT_LEADCHNL_LV_DTM: NORMAL
MDC_IDC_MSMT_LEADCHNL_LV_IMPEDANCE_VALUE: 610
MDC_IDC_MSMT_LEADCHNL_LV_PACING_THRESHOLD_AMPLITUDE: 0.75
MDC_IDC_MSMT_LEADCHNL_LV_PACING_THRESHOLD_POLARITY: NORMAL
MDC_IDC_MSMT_LEADCHNL_LV_PACING_THRESHOLD_PULSEWIDTH: 0.7
MDC_IDC_MSMT_LEADCHNL_RV_DTM: NORMAL
MDC_IDC_MSMT_LEADCHNL_RV_IMPEDANCE_VALUE: 400
MDC_IDC_MSMT_LEADCHNL_RV_PACING_THRESHOLD_AMPLITUDE: 0.62
MDC_IDC_MSMT_LEADCHNL_RV_PACING_THRESHOLD_POLARITY: NORMAL
MDC_IDC_MSMT_LEADCHNL_RV_PACING_THRESHOLD_PULSEWIDTH: 0.5
MDC_IDC_MSMT_LEADCHNL_RV_SENSING_INTR_AMPL: 11.7
MDC_IDC_PG_IMPLANT_DTM: NORMAL
MDC_IDC_PG_MFG: NORMAL
MDC_IDC_PG_MODEL: NORMAL
MDC_IDC_PG_SERIAL: NORMAL
MDC_IDC_PG_TYPE: NORMAL
MDC_IDC_SESS_DTM: NORMAL
MDC_IDC_SESS_TYPE: NORMAL
MDC_IDC_SET_BRADY_LOWRATE: 75
MDC_IDC_SET_BRADY_MAX_SENSOR_RATE: 120
MDC_IDC_SET_BRADY_MODE: NORMAL
MDC_IDC_SET_CRT_LVRV_DELAY: 30
MDC_IDC_SET_CRT_PACED_CHAMBERS: NORMAL
MDC_IDC_SET_LEADCHNL_LV_PACING_AMPLITUDE: 2
MDC_IDC_SET_LEADCHNL_LV_PACING_POLARITY: NORMAL
MDC_IDC_SET_LEADCHNL_LV_PACING_PULSEWIDTH: 0.7
MDC_IDC_SET_LEADCHNL_RA_PACING_POLARITY: NORMAL
MDC_IDC_SET_LEADCHNL_RA_SENSING_POLARITY: NORMAL
MDC_IDC_SET_LEADCHNL_RV_PACING_AMPLITUDE: 2
MDC_IDC_SET_LEADCHNL_RV_PACING_POLARITY: NORMAL
MDC_IDC_SET_LEADCHNL_RV_PACING_PULSEWIDTH: 0.5
MDC_IDC_SET_LEADCHNL_RV_SENSING_POLARITY: NORMAL
MDC_IDC_SET_LEADCHNL_RV_SENSING_SENSITIVITY: 2
MDC_IDC_STAT_CRT_PERCENT_PACED: 91

## 2025-08-28 LAB
MDC_IDC_MSMT_BATTERY_REMAINING_LONGEVITY: 25 MO
MDC_IDC_MSMT_BATTERY_REMAINING_PERCENTAGE: 26 %
MDC_IDC_MSMT_BATTERY_RRT_TRIGGER: 2.62
MDC_IDC_MSMT_BATTERY_STATUS: NORMAL
MDC_IDC_MSMT_BATTERY_VOLTAGE: 2.92
MDC_IDC_MSMT_LEADCHNL_LV_DTM: NORMAL
MDC_IDC_MSMT_LEADCHNL_LV_IMPEDANCE_VALUE: 610
MDC_IDC_MSMT_LEADCHNL_LV_PACING_THRESHOLD_AMPLITUDE: 0.75
MDC_IDC_MSMT_LEADCHNL_LV_PACING_THRESHOLD_POLARITY: NORMAL
MDC_IDC_MSMT_LEADCHNL_LV_PACING_THRESHOLD_PULSEWIDTH: 0.7
MDC_IDC_MSMT_LEADCHNL_RV_DTM: NORMAL
MDC_IDC_MSMT_LEADCHNL_RV_IMPEDANCE_VALUE: 400
MDC_IDC_MSMT_LEADCHNL_RV_PACING_THRESHOLD_AMPLITUDE: 0.62
MDC_IDC_MSMT_LEADCHNL_RV_PACING_THRESHOLD_POLARITY: NORMAL
MDC_IDC_MSMT_LEADCHNL_RV_PACING_THRESHOLD_PULSEWIDTH: 0.5
MDC_IDC_MSMT_LEADCHNL_RV_SENSING_INTR_AMPL: 11.7
MDC_IDC_PG_IMPLANT_DTM: NORMAL
MDC_IDC_PG_MFG: NORMAL
MDC_IDC_PG_MODEL: NORMAL
MDC_IDC_PG_SERIAL: NORMAL
MDC_IDC_PG_TYPE: NORMAL
MDC_IDC_SESS_DTM: NORMAL
MDC_IDC_SESS_TYPE: NORMAL
MDC_IDC_SET_BRADY_LOWRATE: 75
MDC_IDC_SET_BRADY_MAX_SENSOR_RATE: 120
MDC_IDC_SET_BRADY_MODE: NORMAL
MDC_IDC_SET_CRT_LVRV_DELAY: 30
MDC_IDC_SET_CRT_PACED_CHAMBERS: NORMAL
MDC_IDC_SET_LEADCHNL_LV_PACING_AMPLITUDE: 2
MDC_IDC_SET_LEADCHNL_LV_PACING_POLARITY: NORMAL
MDC_IDC_SET_LEADCHNL_LV_PACING_PULSEWIDTH: 0.7
MDC_IDC_SET_LEADCHNL_RA_PACING_POLARITY: NORMAL
MDC_IDC_SET_LEADCHNL_RA_SENSING_POLARITY: NORMAL
MDC_IDC_SET_LEADCHNL_RV_PACING_AMPLITUDE: 2
MDC_IDC_SET_LEADCHNL_RV_PACING_POLARITY: NORMAL
MDC_IDC_SET_LEADCHNL_RV_PACING_PULSEWIDTH: 0.5
MDC_IDC_SET_LEADCHNL_RV_SENSING_POLARITY: NORMAL
MDC_IDC_SET_LEADCHNL_RV_SENSING_SENSITIVITY: 2
MDC_IDC_STAT_CRT_PERCENT_PACED: 91

## (undated) DEVICE — RADIFOCUS GLIDEWIRE: Brand: GLIDEWIRE

## (undated) DEVICE — CATH DIAG EXPO .056 FL3.5 6F 100CM

## (undated) DEVICE — PENCL E/S HNDSWCH ROCKRBTN HOLSTR 10FT

## (undated) DEVICE — BALN PRESS WEDGE 6F 110CM

## (undated) DEVICE — TUBING, SUCTION, 1/4" X 10', STRAIGHT: Brand: MEDLINE

## (undated) DEVICE — LEX ELECTRO PHYSIOLOGY: Brand: MEDLINE INDUSTRIES, INC.

## (undated) DEVICE — CAUTERY TIP POLISHER: Brand: DEVON

## (undated) DEVICE — SET PRIMARY GRVTY 10DP MALE LL 104IN

## (undated) DEVICE — Device

## (undated) DEVICE — DECANT BG O JET

## (undated) DEVICE — RADIFOCUS TORQUE DEVICE MULTI-TORQUE VISE: Brand: RADIFOCUS TORQUE DEVICE

## (undated) DEVICE — IRRIGATOR BULB ASEPTO 60CC STRL

## (undated) DEVICE — MODEL BT2000 P/N 700287-012KIT CONTENTS: MANIFOLD WITH SALINE AND CONTRAST PORTS, SALINE TUBING WITH SPIKE AND HAND SYRINGE, TRANSDUCER: Brand: BT2000 AUTOMATED MANIFOLD KIT

## (undated) DEVICE — ST EXT IV SMARTSITE 2VLV SP M LL 5ML IV1

## (undated) DEVICE — DRSNG SURESITE123 4X4.8IN

## (undated) DEVICE — SOL NACL 0.9PCT 1000ML

## (undated) DEVICE — CATH DIAG EXPO M/ PK 6FR FL4/FR4 PIG 3PK

## (undated) DEVICE — TR BAND RADIAL ARTERY COMPRESSION DEVICE: Brand: TR BAND

## (undated) DEVICE — ADULT, W/LG. BACK PAD, RADIOTRANSPARENT ELEMENT AND LEAD WIRE: Brand: DEFIBRILLATION ELECTRODES

## (undated) DEVICE — CANNULA,ADULT,SOFT-TOUCH,7'TUBE,UC: Brand: PENDING

## (undated) DEVICE — LIMB HOLDER, WRIST/ANKLE: Brand: DEROYAL

## (undated) DEVICE — ST INF PRI SMRTSTE 20DRP 2VLV 24ML 117

## (undated) DEVICE — INTRO SHEATH ENGAGE TR SS/STD .025 6F 12CM

## (undated) DEVICE — CANN NASL CO2 DIVIDED A/

## (undated) DEVICE — CATH COURNARD DECCA CSL 6F120CM

## (undated) DEVICE — LIMB HOLDERS: Brand: DEROYAL

## (undated) DEVICE — DECANTER: Brand: UNBRANDED

## (undated) DEVICE — MODEL AT P54, P/N 700608-035KIT CONTENTS: HAND CONTROLLER, 3-WAY HIGH-PRESSURE STOPCOCK WITH ROTATING END AND PREMIUM HIGH-PRESSURE TUBING: Brand: ANGIOTOUCH® KIT

## (undated) DEVICE — MEDI-VAC YANKAUER SUCTION HANDLE W/BULBOUS TIP: Brand: CARDINAL HEALTH

## (undated) DEVICE — INTRO TEAR AWAY/LVD W/SD PRT 10F 13CM

## (undated) DEVICE — 3M™ STERI-STRIP™ REINFORCED ADHESIVE SKIN CLOSURES, R1547, 1/2 IN X 4 IN (12 MM X 100 MM), 6 STRIPS/ENVELOPE: Brand: 3M™ STERI-STRIP™

## (undated) DEVICE — ST ACC MICROPUNCTURE .018 TRANSLSS/SS/TP 5F/10CM 21G/7CM

## (undated) DEVICE — GUIDE WIRE WITH HYDROPHILIC COATING: Brand: ACUITY WHISPER VIEW™

## (undated) DEVICE — PK CATH CARD 10